# Patient Record
Sex: MALE | Race: WHITE
[De-identification: names, ages, dates, MRNs, and addresses within clinical notes are randomized per-mention and may not be internally consistent; named-entity substitution may affect disease eponyms.]

---

## 2018-08-05 ENCOUNTER — HOSPITAL ENCOUNTER (EMERGENCY)
Dept: HOSPITAL 97 - ER | Age: 38
Discharge: LEFT BEFORE DISCHARGE | End: 2018-08-05
Payer: SELF-PAY

## 2018-08-05 DIAGNOSIS — Z53.21: Primary | ICD-10-CM

## 2018-08-05 PROCEDURE — 99281 EMR DPT VST MAYX REQ PHY/QHP: CPT

## 2018-08-05 NOTE — ER
Nurse's Notes                                                                                     

 [facility]                                                                

Name: Edgar Patel                                                                         

Age: 37 yrs                                                                                       

Sex: Male                                                                                         

: 1980                                                                                   

MRN: [id number]                                                                                   

Arrival Date: 2018                                                                          

Time: 12:09                                                                                       

Account#: [id number]                                                                            

Bed Waiting                                                                                       

Private MD: None, None                                                                            

Diagnosis:                                                                                        

                                                                                                  

Presentation:                                                                                     

                                                                                             

12:17 Presenting complaint: Patient states: "I have these spots on my elbows and one on my    aj1 

      chest." Abscess noted to bilateral elbows, small scabbed area surrounded by redness         

      noted to chest. Patient denies fever. Transition of care: patient was not received from     

      another setting of care. Onset of symptoms was 2018. Risk Assessment: Do you       

      want to hurt yourself or someone else? Patient reports no desire to harm self or            

      others. Initial Sepsis Screen: Does the patient meet any 2 criteria? HR > 90 bpm. Does      

      the patient have a suspected source of infection? Yes: Skin breakdown/wound. Care prior     

      to arrival: None.                                                                           

12:17 Method Of Arrival: Ambulatory                                                           [facility] 

12:17 Acuity: MADI 3                                                                           aj1 

                                                                                                  

Triage Assessment:                                                                                

12:20 General: Appears in no apparent distress. uncomfortable, Behavior is cooperative,       aj1 

      restless. Pain: Complains of pain in right elbow and left elbow Pain currently is 8 out     

      of 10 on a pain scale. Neuro: Level of Consciousness is awake, alert, obeys commands,       

      Oriented to person, place, time, situation, Speech is normal. Cardiovascular: Patient's     

      skin is warm and dry. Respiratory: Airway is patent Respiratory effort is even,             

      unlabored, Respiratory pattern is regular, symmetrical. Derm: Skin is pink, warm \T\ dry.   

      normal.                                                                                     

                                                                                                  

Historical:                                                                                       

- Allergies:                                                                                      

12:20 No Known Allergies;                                                                     aj1 

- Home Meds:                                                                                      

12:20 None [Active];                                                                          aj1 

- PMHx:                                                                                           

12:20 Asthma; Bipolar disorder; Diabetes; Hypertension; Sleep Apnea;                          aj1 

                                                                                                  

- Immunization history:: Flu vaccine is not up to date.                                           

- Social history:: Smoking status: Patient uses tobacco products, smokes one pack                 

  cigarettes per day.                                                                             

- Ebola Screening: : Patient denies travel to an Ebola-affected area in the 21 days               

  before illness onset.                                                                           

                                                                                                  

                                                                                                  

Assessment:                                                                                       

13:06 Reassessment: pt not in lobby or parking lot.                                           iw  

                                                                                                  

Vital Signs:                                                                                      

12:20  / 101; Pulse 124; Resp 20; Temp 98.0(O); Pulse Ox 97% on R/A; Weight 120.2 kg    aj1 

      (R); Height 5 ft. 6 in. (167.64 cm) (R); Pain 8/10;                                         

12:20 Body Mass Index 42.77 (120.20 kg, 167.64 cm)                                            aj1 

                                                                                                  

ED Course:                                                                                        

12:09 Patient arrived in ED.                                                                  mr  

12:09 None, None is Private Physician.                                                        mr  

12:19 Triage completed.                                                                       aj1 

12:20 Arm band placed on Patient placed in waiting room, Patient notified of wait time.       aj1 

12:56 Patient's name was called from ER lobby. No response.                                   iw  

                                                                                                  

Administered Medications:                                                                         

No medications were administered                                                                  

                                                                                                  

                                                                                                  

Outcome:                                                                                          

13:07 Patient left the ED.                                                                    iw  

                                                                                                  

Signatures:                                                                                       

Sydney Madrigal, RN                     RN   aj1                                                  

Violeta Novak                                mr                                                   

Ethel Grimm, ASCENCION                     RN   iw                                                   

                                                                                                  

**************************************************************************************************

## 2018-08-06 ENCOUNTER — HOSPITAL ENCOUNTER (EMERGENCY)
Dept: HOSPITAL 97 - ER | Age: 38
Discharge: HOME | End: 2018-08-06
Payer: SELF-PAY

## 2018-08-06 DIAGNOSIS — F17.210: ICD-10-CM

## 2018-08-06 DIAGNOSIS — I10: ICD-10-CM

## 2018-08-06 DIAGNOSIS — L02.413: Primary | ICD-10-CM

## 2018-08-06 DIAGNOSIS — J45.909: ICD-10-CM

## 2018-08-06 DIAGNOSIS — B95.62: ICD-10-CM

## 2018-08-06 DIAGNOSIS — G47.30: ICD-10-CM

## 2018-08-06 DIAGNOSIS — E11.9: ICD-10-CM

## 2018-08-06 PROCEDURE — 99284 EMERGENCY DEPT VISIT MOD MDM: CPT

## 2018-08-06 PROCEDURE — 87205 SMEAR GRAM STAIN: CPT

## 2018-08-06 PROCEDURE — 87186 SC STD MICRODIL/AGAR DIL: CPT

## 2018-08-06 PROCEDURE — 0J9D0ZZ DRAINAGE OF RIGHT UPPER ARM SUBCUTANEOUS TISSUE AND FASCIA, OPEN APPROACH: ICD-10-PCS

## 2018-08-06 PROCEDURE — 87070 CULTURE OTHR SPECIMN AEROBIC: CPT

## 2018-08-06 PROCEDURE — 87077 CULTURE AEROBIC IDENTIFY: CPT

## 2018-08-06 NOTE — EDPHYS
Physician Documentation                                                                           

 [facility]                                                                

Name: Edgar Patel                                                                         

Age: 37 yrs                                                                                       

Sex: Male                                                                                         

: 1980                                                                                   

MRN: [id number]                                                                                   

Arrival Date: 2018                                                                          

Time: 04:39                                                                                       

Account#: [id number]                                                                            

Bed 5                                                                                             

Private MD:                                                                                       

ED Physician Dave Villarreal                                                                             

HPI:                                                                                              

                                                                                             

05:19 This 37 yrs old  Male presents to ER via Ambulatory with complaints of Skin    pkl 

      Problem.                                                                                    

05:19 The patient presents with an abscess of the right arm. Description: draining,           pkl 

      fluctuant, tense. Onset: The symptoms/episode began/occurred 4 day(s) ago.                  

                                                                                                  

Historical:                                                                                       

- Allergies:                                                                                      

05:06 No Known Allergies;                                                                     jd3 

- Home Meds:                                                                                      

05:06 None [Active];                                                                          jd3 

- PMHx:                                                                                           

05:06 Asthma; Bipolar disorder; Diabetes; Hypertension; Sleep Apnea;                          jd3 

- PSHx:                                                                                           

05:06 None;                                                                                   jd3 

                                                                                                  

- Immunization history:: Adult Immunizations up to date.                                          

- Social history:: Smoking status: Patient uses tobacco products, smokes one pack                 

  cigarettes per day.                                                                             

- Ebola Screening: : Patient negative for fever greater than or equal to 101.5 degrees            

  Fahrenheit, and additional compatible Ebola Virus Disease symptoms.                             

                                                                                                  

                                                                                                  

ROS:                                                                                              

05:19 Eyes: Negative for injury, pain, redness, and discharge, ENT: Negative for injury,      pkl 

      pain, and discharge, Neck: Negative for injury, pain, and swelling, Cardiovascular:         

      Negative for chest pain, palpitations, and edema, Respiratory: Negative for shortness       

      of breath, cough, wheezing, and pleuritic chest pain, Abdomen/GI: Negative for              

      abdominal pain, nausea, vomiting, diarrhea, and constipation, Back: Negative for injury     

      and pain, : Negative for injury, bleeding, discharge, and swelling, MS/Extremity:         

      Negative for injury and deformity.                                                          

05:19 Skin: Positive for abscess, of the right arm.                                               

05:19 Neuro: Negative for altered mental status.                                                  

                                                                                                  

Exam:                                                                                             

05:19 Head/Face:  Normocephalic, atraumatic. Eyes:  Pupils equal round and reactive to light, pkl 

      extra-ocular motions intact.  Lids and lashes normal.  Conjunctiva and sclera are           

      non-icteric and not injected.  Cornea within normal limits.  Periorbital areas with no      

      swelling, redness, or edema. ENT:  Nares patent. No nasal discharge, no septal              

      abnormalities noted.  Tympanic membranes are normal and external auditory canals are        

      clear.  Oropharynx with no redness, swelling, or masses, exudates, or evidence of           

      obstruction, uvula midline.  Mucous membranes moist. Neck:  Trachea midline, no             

      thyromegaly or masses palpated, and no cervical lymphadenopathy.  Supple, full range of     

      motion without nuchal rigidity, or vertebral point tenderness.  No Meningismus.             

      Chest/axilla:  Normal chest wall appearance and motion.  Nontender with no deformity.       

      No lesions are appreciated. Cardiovascular:  Regular rate and rhythm with a normal S1       

      and S2.  No gallops, murmurs, or rubs.  Normal PMI, no JVD.  No pulse deficits.             

      Respiratory:  Lungs have equal breath sounds bilaterally, clear to auscultation and         

      percussion.  No rales, rhonchi or wheezes noted.  No increased work of breathing, no        

      retractions or nasal flaring. Abdomen/GI:  Soft, non-tender, with normal bowel sounds.      

      No distension or tympany.  No guarding or rebound.  No evidence of tenderness               

      throughout. Back:  No spinal tenderness.  No costovertebral tenderness.  Full range of      

      motion. Neuro:  Awake and alert, GCS 15, oriented to person, place, time, and               

      situation.  Cranial nerves II-XII grossly intact.  Motor strength 5/5 in all                

      extremities.  Sensory grossly intact.  Cerebellar exam normal.  Normal gait.                

05:19 Skin: abscess, that is moderate sized, approximately  3 cm(s), with drainage, with          

      induration.                                                                                 

                                                                                                  

Vital Signs:                                                                                      

05:06  / 109; Pulse 113; Resp 16 S; Temp 98.6(O); Pulse Ox 98% on R/A; Weight 120.2 kg  jd3 

      (R); Height 5 ft. 6 in. (167.64 cm) (R); Pain 8/10;                                         

05:06 Body Mass Index 42.77 (120.20 kg, 167.64 cm)                                            jd3 

                                                                                                  

Procedures:                                                                                       

05:19 I \T\ D: Incision and drainage was performed for an abscess of the right Prepped with     pkl

      Betadine, Anesthetized with 3 ml's 1% Lidocaine. Incised with #11 blade. Drained            

      purulent fluid. bloody fluid. Packed with iodoform gauze, Dressing: sterile 4x4 gauze,      

      the patient tolerated the procedure well.                                                   

                                                                                                  

MDM:                                                                                              

04:54 Patient medically screened.                                                             [facility] 

05:19 Data reviewed: vital signs, nurses notes.                                               pkl 

                                                                                                  

                                                                                             

05:20 Order name: Wound Culture                                                               bb  

                                                                                             

05:20 Order name: Wound Culture                                                               bb  

                                                                                                  

Administered Medications:                                                                         

05:29 Drug: Bactrim (160 mg-800 mg (DS) 1 tablet Route: PO;                                   jd3 

05:29 Follow up: Response: Medication administered at discharge.                              jd3 

                                                                                                  

                                                                                                  

Disposition:                                                                                      

18 05:23 Discharged to Home. Impression: Abscess right arm.                                 

- Condition is Stable.                                                                            

                                                                                                  

- Prescriptions for Bactrim - 160 mg Oral Tablet - take 1 tablet by ORAL route              

  every 12 hours for 10 days; 20 tablet.                                                          

- Medication Reconciliation Form, Thank You Letter, Antibiotic Education, Prescription            

  Opioid Use form.                                                                                

- Follow up: Shemar Osman MD; When: 2 - 3 days; Reason: Re-evaluation by your                  

  physician.                                                                                      

- Problem is new.                                                                                 

- Symptoms have improved.                                                                         

                                                                                                  

                                                                                                  

                                                                                                  

Signatures:                                                                                       

Dispatcher [employer]                           EDMS                                                 

Dave Villarreal MD MD   pkBilly Shah RN                    RN   jd3                                                  

                                                                                                  

Corrections: (The following items were deleted from the chart)                                    

05:31 05:23 2018 05:23 Discharged to Home. Impression: Abscess right arm. Condition is  jd3 

      Stable. Forms are Medication Reconciliation Form, Thank You Letter, Antibiotic              

      Education, Prescription Opioid Use. Follow up: Shemar Osman; When: 2 - 3 days; Reason:     

      Re-evaluation by your physician. Problem is new. Symptoms have improved. pkl                

                                                                                                  

**************************************************************************************************

## 2018-08-06 NOTE — ER
Nurse's Notes                                                                                     

 [facility]                                                                

Name: Edgar Patel                                                                         

Age: 37 yrs                                                                                       

Sex: Male                                                                                         

: 1980                                                                                   

MRN: [id number]                                                                                   

Arrival Date: 2018                                                                          

Time: 04:39                                                                                       

Account#: [id number]                                                                            

Bed 5                                                                                             

Private MD:                                                                                       

Diagnosis: Abscess right arm                                                                      

                                                                                                  

Presentation:                                                                                     

                                                                                             

05:03 Presenting complaint: Patient states: "I have these sores on my elbows and I think they jd3 

      might be infected.". Transition of care: patient was not received from another setting      

      of care. Onset of symptoms was 2018. Risk Assessment: Do you want to hurt        

      yourself or someone else? Patient reports no desire to harm self or others. Initial         

      Sepsis Screen: Does the patient meet any 2 criteria? HR > 90 bpm. Yes Does the patient      

      have a suspected source of infection? Yes: Skin breakdown/wound. Care prior to arrival:     

      None.                                                                                       

05:03 Method Of Arrival: Ambulatory                                                           jd3 

05:03 Acuity: MADI 4                                                                           jd3 

                                                                                                  

Historical:                                                                                       

- Allergies:                                                                                      

05:06 No Known Allergies;                                                                     jd3 

- Home Meds:                                                                                      

05:06 None [Active];                                                                          jd3 

- PMHx:                                                                                           

05:06 Asthma; Bipolar disorder; Diabetes; Hypertension; Sleep Apnea;                          jd3 

- PSHx:                                                                                           

05:06 None;                                                                                   jd3 

                                                                                                  

- Immunization history:: Adult Immunizations up to date.                                          

- Social history:: Smoking status: Patient uses tobacco products, smokes one pack                 

  cigarettes per day.                                                                             

- Ebola Screening: : Patient negative for fever greater than or equal to 101.5 degrees            

  Fahrenheit, and additional compatible Ebola Virus Disease symptoms.                             

                                                                                                  

                                                                                                  

Screenin:09 Abuse screen: Denies threats or abuse. Nutritional screening: No deficits noted.        jd3 

      Tuberculosis screening: No symptoms or risk factors identified. Fall Risk Ambulatory        

      Aid- None/Bed Rest/Nurse Assist (0 pts). Gait- Normal/Bed Rest/Wheelchair (0 pts)           

      Mental Status- Oriented to own ability (0 pts). Total Rivers Fall Scale indicates No         

      Risk (0-24 pts).                                                                            

                                                                                                  

Assessment:                                                                                       

05:08 General: Appears in no apparent distress. uncomfortable, Behavior is calm, cooperative, jd3 

      appropriate for age. Pain: Complains of pain in right elbow and left elbow. Neuro:          

      Level of Consciousness is awake, alert, obeys commands, Oriented to person, place,          

      time, situation. Cardiovascular: Capillary refill < 3 seconds Patient's skin is warm        

      and dry. Respiratory: Airway is patent Respiratory effort is even, unlabored,               

      Respiratory pattern is regular, symmetrical. GI: No signs and/or symptoms were reported     

      involving the gastrointestinal system. : No signs and/or symptoms were reported           

      regarding the genitourinary system. EENT: No signs and/or symptoms were reported            

      regarding the EENT system. Derm: Skin is intact, Skin is dry, Skin is normal, Skin          

      temperature is warm Wound noted right elbow and left elbow Wound is wounds are open,        

      with swelling and redness noted. Musculoskeletal: Circulation, motion, and sensation        

      intact. Range of motion: intact in all extremities.                                         

05:30 Reassessment: Patient appears in no apparent distress at this time. Patient and/or      jd3 

      family updated on plan of care and expected duration. Pain level reassessed. Patient is     

      alert, oriented x 3, equal unlabored respirations, skin warm/dry/pink.                      

                                                                                                  

Vital Signs:                                                                                      

05:06  / 109; Pulse 113; Resp 16 S; Temp 98.6(O); Pulse Ox 98% on R/A; Weight 120.2 kg  jd3 

      (R); Height 5 ft. 6 in. (167.64 cm) (R); Pain 8/10;                                         

05:06 Body Mass Index 42.77 (120.20 kg, 167.64 cm)                                            jd3 

                                                                                                  

ED Course:                                                                                        

04:39 Patient arrived in ED.                                                                  am2 

04:54 Dave Villarreal MD is Attending Physician.                                                    pkl 

04:54 Billy Fallon, RN is Primary Nurse.                                                  jd3 

05:05 Triage completed.                                                                       jd3 

05:07 Arm band placed on.                                                                     jd3 

05:09 Patient has correct armband on for positive identification. Bed in low position. Call   j 

      light in reach. Side rails up X 1.                                                          

05:20 Assist provider with I \T\ D: of an abscess on right upper arm Set up I\T\D tray. Performed tl
1

      by Dave Villarreal MD Culture sent to lab. Wound packed. iodoform gauze, Dressing with 4X4s,        

      Patient tolerated well. Patient did not have IV access during this emergency room visit.    

05:23 Shemar Osman MD is Referral Physician.                                               pkl 

                                                                                                  

Administered Medications:                                                                         

05:29 Drug: Bactrim (160 mg-800 mg (DS) 1 tablet Route: PO;                                   jd3 

05:29 Follow up: Response: Medication administered at discharge.                              jd3 

                                                                                                  

                                                                                                  

Outcome:                                                                                          

05:23 Discharge ordered by MD.                                                                pkl 

05:30 Discharged to home ambulatory.                                                          jd3 

05:30 Condition: stable                                                                           

05:30 Discharge instructions given to patient, Instructed on discharge instructions, follow       

      up and referral plans. medication usage, Demonstrated understanding of instructions,        

      follow-up care, medications, Prescriptions given X 1.                                       

05:31 Patient left the ED.                                                                    jd3 

                                                                                                  

Addendum:                                                                                         

2018                                                                                        

     07:31 Addendum: Culture Results: Positive wound culture. No further action required. Bacteria i
w

           sensitive to prescribed antibiotic.                                                    

                                                                                                  

Signatures:                                                                                       

Dave Villarreal MD MD   pkl                                                  

Ethel Grimm RN                     RN   iw                                                   

Jaz Chávez RN                      RN   tl1                                                  

Keesha Viera Jonathon, RN                    RN   jd3                                                  

                                                                                                  

**************************************************************************************************

## 2019-03-29 ENCOUNTER — HOSPITAL ENCOUNTER (EMERGENCY)
Dept: HOSPITAL 97 - ER | Age: 39
Discharge: HOME | End: 2019-03-29
Payer: SELF-PAY

## 2019-03-29 DIAGNOSIS — I10: ICD-10-CM

## 2019-03-29 DIAGNOSIS — M54.5: Primary | ICD-10-CM

## 2019-03-29 DIAGNOSIS — F17.210: ICD-10-CM

## 2019-03-29 PROCEDURE — 99283 EMERGENCY DEPT VISIT LOW MDM: CPT

## 2019-03-29 NOTE — ER
Nurse's Notes                                                                                     

 [facility]                                                                 

Name: Edgar Patel                                                                         

Age: 38 yrs                                                                                       

Sex: Male                                                                                         

: 1980                                                                                   

MRN: [id number]                                                                                   

Arrival Date: 2019                                                                          

Time: 19:26                                                                                       

Account#: [id number]                                                                            

Bed 24                                                                                            

Private MD: None, None                                                                            

Diagnosis: Low back pain                                                                          

                                                                                                  

Presentation:                                                                                     

                                                                                             

19:32 Presenting complaint: Patient states: "3 weeks ago I had my sciatic nerve hurting and   aj1 

      now its at the point where I can barely get up or down. Every time I try to get up its      

      like a shocking pain" Patient reports that he has been taking ibuprofen at home with no     

      relief. Transition of care: patient was not received from another setting of care.          

      Onset of symptoms was . Risk Assessment: Do you want to hurt yourself or someone        

      else? Patient reports no desire to harm self or others. Initial Sepsis Screen: Does the     

      patient meet any 2 criteria? No. Patient's initial sepsis screen is negative. Does the      

      patient have a suspected source of infection? No. Patient's initial sepsis screen is        

      negative. Care prior to arrival: None.                                                      

19:32 Method Of Arrival: Ambulatory                                                           aj 

19:32 Acuity: MADI 4                                                                           aj1 

                                                                                                  

Triage Assessment:                                                                                

19:33 General: Appears in no apparent distress. uncomfortable, Behavior is calm, cooperative, aj1 

      appropriate for age. Pain: Complains of pain in back Pain currently is 8 out of 10 on a     

      pain scale. Neuro: Level of Consciousness is awake, alert, obeys commands.                  

      Cardiovascular: Patient's skin is warm and dry. Respiratory: Airway is patent               

      Respiratory effort is even, unlabored, Respiratory pattern is regular, symmetrical.         

      Musculoskeletal: Range of motion: intact in all extremities.                                

                                                                                                  

Historical:                                                                                       

- Allergies:                                                                                      

19:33 No Known Allergies;                                                                     aj1 

- Home Meds:                                                                                      

19:33 None [Active];                                                                          aj1 

- PMHx:                                                                                           

19:33 Asthma; Bipolar disorder; Diabetes; Hypertension; Sleep Apnea;                          aj1 

                                                                                                  

- Immunization history:: Flu vaccine is not up to date.                                           

- Social history:: Smoking status: Patient uses tobacco products, smokes one pack                 

  cigarettes per day.                                                                             

- Ebola Screening: : Patient denies travel to an Ebola-affected area in the 21 days               

  before illness onset.                                                                           

                                                                                                  

                                                                                                  

Screenin:45 Abuse screen: Denies threats or abuse. Nutritional screening: No deficits noted.        jb4 

      Tuberculosis screening: No symptoms or risk factors identified. Fall Risk None              

      identified.                                                                                 

                                                                                                  

Assessment:                                                                                       

19:45 General: Appears in no apparent distress. uncomfortable, Behavior is calm, cooperative, jb4 

      appropriate for age. Pain: Complains of pain in sacrum Pain radiates to back and left       

      leg Pain currently is 7 out of 10 on a pain scale. at worst was 10 out of 10 on a pain      

      scale. Quality of pain is described as shooting, stabbing, jolting Pain began 3 weeks       

      ago. Neuro: Level of Consciousness is awake, alert, obeys commands, Oriented to person,     

      place, time, situation, Moves all extremities. Full function. Cardiovascular: Patient's     

      skin is warm and dry. Respiratory: Airway is patent Respiratory effort is even,             

      unlabored, Respiratory pattern is regular, symmetrical. GI: No signs and/or symptoms        

      were reported involving the gastrointestinal system. : No signs and/or symptoms were      

      reported regarding the genitourinary system. EENT: No signs and/or symptoms were            

      reported regarding the EENT system. Derm: Skin is intact, Skin is pink, warm \T\ dry.       

      Musculoskeletal: Circulation, motion, and sensation intact. Range of motion: intact in      

      all extremities.                                                                            

                                                                                                  

Vital Signs:                                                                                      

19:33  / 96; Pulse 122; Resp 18; Temp 97.2; Pulse Ox 97% on R/A; Weight 104.33 kg (R);  aj1 

      Height 5 ft. 6 in. (167.64 cm);                                                             

19:33 Pain 8/10;                                                                              aj1 

20:03  / 88 LA (auto/lg); Pulse 101; Resp 18; Temp 99.6(O); Pulse Ox 96% ; Pain 7/10;   jp3 

19:33 Body Mass Index 37.12 (104.33 kg, 167.64 cm)                                            aj1 

                                                                                                  

ED Course:                                                                                        

19:26 Patient arrived in ED.                                                                  mr  

19:26 None, None is Private Physician.                                                        mr  

19:33 Triage completed.                                                                       aj1 

19:33 Arm band placed on Patient placed in an exam room.                                      aj1 

19:44 Shemar Portillo, RN is Primary Nurse.                                                     jb4 

19:45 Patient has correct armband on for positive identification. Bed in low position. Call   jb4 

      light in reach. Side rails up X 1. Pulse ox on. NIBP on.                                    

19:46 Susana Lee FNP-C is PHCP.                                                        kb  

19:46 Lauri Green MD is Attending Physician.                                             kb  

20:00 No provider procedures requiring assistance completed. Patient did not have IV access   jb4 

      during this emergency room visit.                                                           

                                                                                                  

Administered Medications:                                                                         

No medications were administered                                                                  

                                                                                                  

                                                                                                  

Outcome:                                                                                          

19:54 Discharge ordered by MD.                                                                kb  

20:00 Discharged to home ambulatory.                                                          jb4 

20:00 Condition: stable                                                                           

20:00 Discharge instructions given to patient, Instructed on discharge instructions, follow       

      up and referral plans. medication usage, Demonstrated understanding of instructions,        

      follow-up care, medications, Prescriptions given X 1.                                       

20:18 Patient left the ED.                                                                    jb4 

                                                                                                  

Signatures:                                                                                       

Susana Lee FNP-C                 FNP-Sydney Vera, RN                     RN   aj1                                                  

Rachel Novak James, RN                       RN   jb4                                                  

Baldemar Iniguez jp3                                                  

                                                                                                  

**************************************************************************************************

## 2019-03-29 NOTE — EDPHYS
Physician Documentation                                                                           

 [facility]                                                                 

Name: Edgar Patel                                                                         

Age: 38 yrs                                                                                       

Sex: Male                                                                                         

: 1980                                                                                   

MRN: [id number]                                                                                   

Arrival Date: 2019                                                                          

Time: 19:26                                                                                       

Account#: [id number]                                                                            

Bed 24                                                                                            

Private MD: None, None                                                                            

ED Physician Lauri Green                                                                      

HPI:                                                                                              

                                                                                             

19:54 This 38 yrs old  Male presents to ER via Ambulatory with complaints of Back    kb  

      Pain.                                                                                       

19:54 The patient presents with pain that is acute, with no known mechanism of injury. The    kb  

      symptoms are located in the sacrum. Onset: The symptoms/episode began/occurred 3            

      week(s) ago. The pain does not radiate. Associated signs and symptoms: Pertinent            

      positives: none. The problem was sustained without known cause. Modifying factors: The      

      patient symptoms are alleviated by nothing, the patient symptoms are aggravated by any      

      movement. Severity of symptoms: At their worst the symptoms were moderate, in the           

      emergency department the symptoms are unchanged. The patient has not experienced            

      similar symptoms in the past. The patient has not recently seen a physician.                

                                                                                                  

Historical:                                                                                       

- Allergies:                                                                                      

19:33 No Known Allergies;                                                                     aj1 

- Home Meds:                                                                                      

19:33 None [Active];                                                                          aj1 

- PMHx:                                                                                           

19:33 Asthma; Bipolar disorder; Diabetes; Hypertension; Sleep Apnea;                          aj1 

                                                                                                  

- Immunization history:: Flu vaccine is not up to date.                                           

- Social history:: Smoking status: Patient uses tobacco products, smokes one pack                 

  cigarettes per day.                                                                             

- Ebola Screening: : Patient denies travel to an Ebola-affected area in the 21 days               

  before illness onset.                                                                           

                                                                                                  

                                                                                                  

ROS:                                                                                              

19:53 Constitutional: Negative for fever, chills, and weight loss, Cardiovascular: Negative   kb  

      for chest pain, palpitations, and edema, Respiratory: Negative for shortness of breath,     

      cough, wheezing, and pleuritic chest pain, Abdomen/GI: Negative for abdominal pain,         

      nausea, vomiting, diarrhea, and constipation, : Negative for injury, bleeding,            

      discharge, and swelling, MS/Extremity: Negative for injury and deformity, Skin:             

      Negative for injury, rash, and discoloration, Neuro: Negative for headache, weakness,       

      numbness, tingling, and seizure.                                                            

19:53 Back: Positive for pain at rest, pain with movement, of the sacrum.                         

                                                                                                  

Exam:                                                                                             

19:53 Constitutional:  This is a well developed, well nourished patient who is awake, alert,  kb  

      and in no acute distress. Head/Face:  Normocephalic, atraumatic. Chest/axilla:  Normal      

      chest wall appearance and motion.  Nontender with no deformity.  No lesions are             

      appreciated. Cardiovascular:  Regular rate and rhythm with a normal S1 and S2.  No          

      gallops, murmurs, or rubs.  Normal PMI, no JVD.  No pulse deficits. Respiratory:  Lungs     

      have equal breath sounds bilaterally, clear to auscultation and percussion.  No rales,      

      rhonchi or wheezes noted.  No increased work of breathing, no retractions or nasal          

      flaring. Abdomen/GI:  Soft, non-tender, with normal bowel sounds.  No distension or         

      tympany.  No guarding or rebound.  No evidence of tenderness throughout. Back:  No          

      spinal tenderness.  No costovertebral tenderness.  Full range of motion. Skin:  Warm,       

      dry with normal turgor.  Normal color with no rashes, no lesions, and no evidence of        

      cellulitis. MS/ Extremity:  Pulses equal, no cyanosis.  Neurovascular intact.  Full,        

      normal range of motion. Neuro:  Awake and alert, GCS 15, oriented to person, place,         

      time, and situation.  Cranial nerves II-XII grossly intact.  Motor strength 5/5 in all      

      extremities.  Sensory grossly intact.  Cerebellar exam normal.  Normal gait.                

                                                                                                  

Vital Signs:                                                                                      

19:33  / 96; Pulse 122; Resp 18; Temp 97.2; Pulse Ox 97% on R/A; Weight 104.33 kg (R);  aj1 

      Height 5 ft. 6 in. (167.64 cm);                                                             

19:33 Pain 8/10;                                                                              aj1 

20:03  / 88 LA (auto/lg); Pulse 101; Resp 18; Temp 99.6(O); Pulse Ox 96% ; Pain 7/10;   jp3 

19:33 Body Mass Index 37.12 (104.33 kg, 167.64 cm)                                            aj1 

                                                                                                  

MDM:                                                                                              

19:46 Patient medically screened.                                                             kb  

19:53 Data reviewed: vital signs, nurses notes. Data interpreted: Pulse oximetry: on room air kb  

      is 97 %. Interpretation: normal. Counseling: I had a detailed discussion with the           

      patient and/or guardian regarding: the historical points, exam findings, and any            

      diagnostic results supporting the discharge/admit diagnosis, the need for outpatient        

      follow up, a family practitioner, to return to the emergency department if symptoms         

      worsen or persist or if there are any questions or concerns that arise at home.             

                                                                                                  

                                                                                             

19:52 Order name: Vital Signs; Complete Time: 20:03                                           kb  

                                                                                                  

Administered Medications:                                                                         

No medications were administered                                                                  

                                                                                                  

                                                                                                  

Disposition:                                                                                      

19 19:54 Discharged to Home. Impression: Low back pain.                                     

- Condition is Stable.                                                                            

- Discharge Instructions: Back Injury Prevention, Easy-to-Read, Back Pain, Adult,                 

  Easy-to-Read, Back Exercises, Easy-to-Read.                                                     

- Prescriptions for Cyclobenzaprine 10 mg Oral Tablet - take 1 tablet by ORAL route               

  every 8 hours As needed; 21 tablet.                                                             

- Medication Reconciliation Form, Thank You Letter, Antibiotic Education, Prescription            

  Opioid Use form.                                                                                

- Follow up: Private Physician; When: 2 - 3 days; Reason: Recheck today's complaints,             

  Continuance of care, Re-evaluation by your physician. Follow up: Emergency                      

  Department; When: As needed; Reason: Worsening of condition.                                    

                                                                                                  

                                                                                                  

                                                                                                  

Addendum:                                                                                         

2019                                                                                        

     11:12 Co-signature as Attending Physician, Lauri Green MD I agree with the assessment and  c
ha

           plan of care.                                                                          

                                                                                                  

Signatures:                                                                                       

Susana Lee, CONNIE-C                 BRADYP-Sydney Vera RN                     RN   aj1                                                  

Lauri Green MD MD cha Bryson, James, RN                       RN   jb4                                                  

                                                                                                  

Corrections: (The following items were deleted from the chart)                                    

                                                                                             

20:18 19:54 2019 19:54 Discharged to Home. Impression: Low back pain. Condition is      jb4 

      Stable. Forms are Medication Reconciliation Form, Thank You Letter, Antibiotic              

      Education, Prescription Opioid Use. Follow up: Private Physician; When: 2 - 3 days;         

      Reason: Recheck today's complaints, Continuance of care, Re-evaluation by your              

      physician. Follow up: Emergency Department; When: As needed; Reason: Worsening of           

      condition. kb                                                                               

                                                                                                  

**************************************************************************************************

## 2019-06-07 ENCOUNTER — HOSPITAL ENCOUNTER (INPATIENT)
Dept: HOSPITAL 97 - ER | Age: 39
LOS: 3 days | Discharge: HOME | DRG: 683 | End: 2019-06-10
Attending: HOSPITALIST | Admitting: HOSPITALIST
Payer: SELF-PAY

## 2019-06-07 DIAGNOSIS — C80.1: ICD-10-CM

## 2019-06-07 DIAGNOSIS — F17.210: ICD-10-CM

## 2019-06-07 DIAGNOSIS — K21.9: ICD-10-CM

## 2019-06-07 DIAGNOSIS — E86.0: ICD-10-CM

## 2019-06-07 DIAGNOSIS — I10: ICD-10-CM

## 2019-06-07 DIAGNOSIS — R79.89: ICD-10-CM

## 2019-06-07 DIAGNOSIS — R91.8: ICD-10-CM

## 2019-06-07 DIAGNOSIS — M54.9: ICD-10-CM

## 2019-06-07 DIAGNOSIS — E87.6: ICD-10-CM

## 2019-06-07 DIAGNOSIS — E66.9: ICD-10-CM

## 2019-06-07 DIAGNOSIS — C78.00: ICD-10-CM

## 2019-06-07 DIAGNOSIS — R11.2: ICD-10-CM

## 2019-06-07 DIAGNOSIS — E86.1: ICD-10-CM

## 2019-06-07 DIAGNOSIS — F19.11: ICD-10-CM

## 2019-06-07 DIAGNOSIS — N17.9: Primary | ICD-10-CM

## 2019-06-07 DIAGNOSIS — F81.9: ICD-10-CM

## 2019-06-07 LAB
ALBUMIN SERPL BCP-MCNC: 4.7 G/DL (ref 3.4–5)
ALP SERPL-CCNC: 65 U/L (ref 45–117)
ALT SERPL W P-5'-P-CCNC: 40 U/L (ref 12–78)
AST SERPL W P-5'-P-CCNC: 94 U/L (ref 15–37)
BUN BLD-MCNC: 60 MG/DL (ref 7–18)
GLUCOSE SERPLBLD-MCNC: 167 MG/DL (ref 74–106)
HCT VFR BLD CALC: 47.3 % (ref 39.6–49)
LIPASE SERPL-CCNC: 94 U/L (ref 73–393)
LYMPHOCYTES # SPEC AUTO: 1.8 K/UL (ref 0.7–4.9)
PMV BLD: 10.3 FL (ref 7.6–11.3)
POTASSIUM SERPL-SCNC: 3.6 MMOL/L (ref 3.5–5.1)
RBC # BLD: 5.37 M/UL (ref 4.33–5.43)

## 2019-06-07 PROCEDURE — 87340 HEPATITIS B SURFACE AG IA: CPT

## 2019-06-07 PROCEDURE — 36415 COLL VENOUS BLD VENIPUNCTURE: CPT

## 2019-06-07 PROCEDURE — 81003 URINALYSIS AUTO W/O SCOPE: CPT

## 2019-06-07 PROCEDURE — 87389 HIV-1 AG W/HIV-1&-2 AB AG IA: CPT

## 2019-06-07 PROCEDURE — 80307 DRUG TEST PRSMV CHEM ANLYZR: CPT

## 2019-06-07 PROCEDURE — 96375 TX/PRO/DX INJ NEW DRUG ADDON: CPT

## 2019-06-07 PROCEDURE — 85025 COMPLETE CBC W/AUTO DIFF WBC: CPT

## 2019-06-07 PROCEDURE — 80048 BASIC METABOLIC PNL TOTAL CA: CPT

## 2019-06-07 PROCEDURE — 76770 US EXAM ABDO BACK WALL COMP: CPT

## 2019-06-07 PROCEDURE — 96361 HYDRATE IV INFUSION ADD-ON: CPT

## 2019-06-07 PROCEDURE — 99285 EMERGENCY DEPT VISIT HI MDM: CPT

## 2019-06-07 PROCEDURE — 74176 CT ABD & PELVIS W/O CONTRAST: CPT

## 2019-06-07 PROCEDURE — 84100 ASSAY OF PHOSPHORUS: CPT

## 2019-06-07 PROCEDURE — 83690 ASSAY OF LIPASE: CPT

## 2019-06-07 PROCEDURE — 87088 URINE BACTERIA CULTURE: CPT

## 2019-06-07 PROCEDURE — 87086 URINE CULTURE/COLONY COUNT: CPT

## 2019-06-07 PROCEDURE — 86803 HEPATITIS C AB TEST: CPT

## 2019-06-07 PROCEDURE — 96374 THER/PROPH/DIAG INJ IV PUSH: CPT

## 2019-06-07 PROCEDURE — 71250 CT THORAX DX C-: CPT

## 2019-06-07 PROCEDURE — 83735 ASSAY OF MAGNESIUM: CPT

## 2019-06-07 PROCEDURE — 80076 HEPATIC FUNCTION PANEL: CPT

## 2019-06-07 PROCEDURE — 84132 ASSAY OF SERUM POTASSIUM: CPT

## 2019-06-07 PROCEDURE — 81015 MICROSCOPIC EXAM OF URINE: CPT

## 2019-06-07 PROCEDURE — 86704 HEP B CORE ANTIBODY TOTAL: CPT

## 2019-06-08 LAB
BUN BLD-MCNC: 49 MG/DL (ref 7–18)
CAOX CRY URNS QL MICRO: (no result)
GLUCOSE SERPLBLD-MCNC: 125 MG/DL (ref 74–106)
MAGNESIUM SERPL-MCNC: 2.8 MG/DL (ref 1.8–2.4)
METHAMPHET UR QL SCN: POSITIVE
POTASSIUM SERPL-SCNC: 3.2 MMOL/L (ref 3.5–5.1)
THC SERPL-MCNC: NEGATIVE NG/ML
UA COMPLETE W REFLEX CULTURE PNL UR: (no result)
UA DIPSTICK W REFLEX MICRO PNL UR: (no result)

## 2019-06-08 RX ADMIN — Medication SCH: at 21:00

## 2019-06-08 RX ADMIN — SODIUM CHLORIDE SCH: 0.9 INJECTION, SOLUTION INTRAVENOUS at 11:00

## 2019-06-08 RX ADMIN — MORPHINE SULFATE PRN MG: 4 INJECTION, SOLUTION INTRAMUSCULAR; INTRAVENOUS at 14:51

## 2019-06-08 RX ADMIN — SODIUM CHLORIDE SCH MLS: 0.9 INJECTION, SOLUTION INTRAVENOUS at 22:25

## 2019-06-08 RX ADMIN — SODIUM CHLORIDE SCH MLS: 0.9 INJECTION, SOLUTION INTRAVENOUS at 04:24

## 2019-06-08 RX ADMIN — MORPHINE SULFATE PRN MG: 4 INJECTION, SOLUTION INTRAMUSCULAR; INTRAVENOUS at 05:21

## 2019-06-08 RX ADMIN — ENOXAPARIN SODIUM SCH MG: 40 INJECTION SUBCUTANEOUS at 08:51

## 2019-06-08 RX ADMIN — SODIUM CHLORIDE SCH: 0.9 INJECTION, SOLUTION INTRAVENOUS at 19:00

## 2019-06-08 RX ADMIN — SODIUM CHLORIDE SCH MLS: 0.9 INJECTION, SOLUTION INTRAVENOUS at 14:51

## 2019-06-08 RX ADMIN — Medication SCH ML: at 08:52

## 2019-06-08 NOTE — EDPHYS
Physician Documentation                                                                           

 [facility]                                                                 

Name: Edgar Patel                                                                         

Age: 38 yrs                                                                                       

Sex: Male                                                                                         

: 1980                                                                                   

MRN: [id number]                                                                                   

Arrival Date: 2019                                                                          

Time: 22:40                                                                                       

Account#: [id number]                                                                            

Bed 28                                                                                            

Private MD:                                                                                       

ED Physician Collins Sigala                                                                     

HPI:                                                                                              

                                                                                             

03:37 This 38 yrs old  Male presents to ER via Wheelchair with complaints of         tw4 

      Abdominal Pain.                                                                             

03:37 The patient presents with abdominal pain in the epigastric area. Onset: The             tw4 

      symptoms/episode began/occurred today. The symptoms do not radiate. Associated signs        

      and symptoms: none. The symptoms are described as dull. Modifying factors: The symptoms     

      are alleviated by nothing, the symptoms are aggravated by nothing. Severity of pain: At     

      its worst the pain was moderate in the emergency department the pain. The patient has       

      not experienced similar symptoms in the past.                                               

                                                                                                  

Historical:                                                                                       

- Allergies:                                                                                      

                                                                                             

22:54 No Known Allergies;                                                                     ca1 

- Home Meds:                                                                                      

22:54 None [Active];                                                                          ca1 

- PMHx:                                                                                           

22:54 Asthma; Bipolar disorder; Diabetes; Hypertension; Sleep Apnea;                          ca1 

- PSHx:                                                                                           

22:54 None;                                                                                   ca1 

                                                                                                  

- Immunization history:: Adult Immunizations not up to date, Flu vaccine is not up to             

  date.                                                                                           

- Social history:: Smoking status: Patient uses tobacco products, smokes one-half pack            

  cigarettes per day.                                                                             

- Ebola Screening: : Patient negative for fever greater than or equal to 101.5 degrees            

  Fahrenheit, and additional compatible Ebola Virus Disease symptoms Patient denies               

  exposure to infectious person Patient denies travel to an Ebola-affected area in the            

  21 days before illness onset.                                                                   

                                                                                                  

                                                                                                  

ROS:                                                                                              

                                                                                             

03:37 Constitutional: Negative for fever, chills, and weight loss, Cardiovascular: Negative   tw4 

      for chest pain, palpitations, and edema, Respiratory: Negative for shortness of breath,     

      cough, wheezing, and pleuritic chest pain, Back: Negative for injury and pain, Neuro:       

      Negative for headache, weakness, numbness, tingling, and seizure, Psych: Negative for       

      depression, anxiety, suicide ideation, homicidal ideation, and hallucinations.              

      Constitutional: Positive for                                                                

      Abdomen/GI: Positive for abdominal pain, abdominal cramps, abdominal distension.            

                                                                                                  

Exam:                                                                                             

03:37 Constitutional:  This is a well developed, well nourished patient who is awake, alert,  tw4 

      and in no acute distress. Cardiovascular:  Regular rate and rhythm with a normal S1 and     

      S2.  No gallops, murmurs, or rubs.  Normal PMI, no JVD.  No pulse deficits.                 

      Respiratory:  Lungs have equal breath sounds bilaterally, clear to auscultation and         

      percussion.  No rales, rhonchi or wheezes noted.  No increased work of breathing, no        

      retractions or nasal flaring.                                                               

03:37 Abdomen/GI:  Soft, non-tender, with normal bowel sounds.  No distension or tympany.  No     

      guarding or rebound.  No evidence of tenderness throughout. Back:  No spinal                

      tenderness.  No costovertebral tenderness.  Full range of motion. MS/ Extremity:            

      Pulses equal, no cyanosis.  Neurovascular intact.  Full, normal range of motion. Neuro:     

       Awake and alert, GCS 15, oriented to person, place, time, and situation.  Cranial          

      nerves II-XII grossly intact.  Motor strength 5/5 in all extremities.  Sensory grossly      

      intact.  Cerebellar exam normal.  Normal gait.                                              

                                                                                                  

Vital Signs:                                                                                      

                                                                                             

22:54  / 94; Pulse 102; Resp 19 S; Temp 97.8(O); Pulse Ox 94% on R/A; Weight 111.13 kg; ca1 

      Height 5 ft. 6 in. (167.64 cm); Pain 8/10;                                                  

23:59  / 83; Pulse 90; Resp 18 S; Pulse Ox 96% on R/A;                                  ca1 

                                                                                             

00:30  / 88; Pulse 86; Resp 17; Pulse Ox 96% ;                                          rv  

01:30  / 72; Pulse 89; Resp 17; Pulse Ox 100% ;                                         rv  

02:00  / 81; Pulse 81; Resp 17; Temp 98; Pulse Ox 99% ;                                 rv  

03:00  / 86; Pulse 87; Resp 16; Pulse Ox 97% ;                                          rv  

                                                                                             

22:54 Body Mass Index 39.54 (111.13 kg, 167.64 cm)                                            ca1 

                                                                                                  

MDM:                                                                                              

                                                                                             

23:03 Patient medically screened.                                                             tw4 

                                                                                             

03:37 Data reviewed: vital signs, nurses notes. Data interpreted: Pulse oximetry:.            tw4 

      Counseling: I had a detailed discussion with the patient and/or guardian regarding: the     

      historical points, exam findings, and any diagnostic results supporting the                 

      discharge/admit diagnosis, radiology results. Physician consultation: Isabel Ruano MD      

      regarding admission, and will see patient in ED.                                            

                                                                                                  

                                                                                             

23:02 Order name: Basic Metabolic Panel                                                       [facility] 

                                                                                             

23:02 Order name: CBC with Diff; Complete Time: 00:20                                         tw 

                                                                                             

00:22 Interpretation: Normal except: WBC 17.9; SYDNEY% 81.5; LYM% 10.3; NEUT A 14.6.             tw 

                                                                                             

23:02 Order name: Creatinine for Radiology; Complete Time: 00:44                              [facility] 

                                                                                             

00:44 Interpretation: Normal except: CRE 2.81; GFR 25.                                        tw 

                                                                                             

23:02 Order name: Hepatic Function; Complete Time: 00:44                                      [facility] 

                                                                                             

00:44 Interpretation: AST 94; BILIT 1.2; BILID 0.3; TP 9.3; GLOB 4.6; A/G 1.0.                [facility] 

                                                                                             

23:02 Order name: Lipase; Complete Time: 00:44                                                [facility] 

                                                                                             

00:44 Interpretation: Within normal limits: LIP 94.                                           [facility] 

                                                                                             

23:02 Order name: Urine Microscopic Only                                                      [facility] 

                                                                                             

23:03 Order name: Basic Metabolic Panel; Complete Time: 00:44                                 [facility]

                                                                                             

00:44 Interpretation: Normal except: GLUC 167; ; BUN 60; CRE 2.73; GFR 26.              [facility] 

                                                                                             

00:47 Order name: Abdomen                                                                     [facility]

                                                                                             

01:08 Order name: Urine Culture                                                               [facility]

                                                                                             

03:04 Order name: Thorax Wo Con                                                               [facility]

                                                                                             

23:02 Order name: IV Saline Lock; Complete Time: 23:08                                        [facility] 

                                                                                             

23:02 Order name: Labs collected and sent; Complete Time: 23:08                               [facility] 

                                                                                             

23:02 Order name: Urine Dipstick-Ancillary (obtain specimen); Complete Time: 23:18            [facility] 

                                                                                             

03:03 Order name: CONS Physician Consult                                                      [facility]

                                                                                             

03:03 Order name: NPO                                                                         [facility]

                                                                                                  

Administered Medications:                                                                         

01:45 Drug: GI Cocktail without Donnatal - (Maalox Suspension 30 ml, Lidocaine Liquid 2 % 15  rv  

      ml) Route: PO;                                                                              

03:03 Follow up: Response: Marked relief of symptoms                                          rv  

01:45 Drug: NS 0.9% 1000 ml Route: IV; Rate: 1 bolus; Site: left antecubital;                 rv  

03:03 Follow up: IV Status: Completed infusion; IV Intake: 1000ml                             rv  

01:46 Drug: Zofran 4 mg Route: IVP; Site: left antecubital;                                   rv  

03:03 Follow up: Response: Marked relief of symptoms                                          rv  

01:48 Drug: ProTONIX 40 mg Route: IVP; Site: left antecubital;                                rv  

03:02 Follow up: Response: Marked relief of symptoms                                          rv  

                                                                                                  

                                                                                                  

Disposition:                                                                                      

19 02:39 Hospitalization ordered by Isabel Ruano for Inpatient Admission. Preliminary     

  diagnosis are Acute kidney failure, Dehydration, Cyclical vomiting,                             

  intractable.                                                                                    

- Bed requested for Telemetry/MedSurg (Inpatient).                                                

- Status is Inpatient Admission.                                                              fc  

- Condition is Stable.                                                                            

- Problem is new.                                                                                 

- Symptoms have improved.                                                                         

UTI on Admission? No                                                                              

                                                                                                  

                                                                                                  

                                                                                                  

Signatures:                                                                                       

Dispatcher [employer]                           EDMS                                                 

Ning Vides RN RN                                                      

Skyla Hammond RN RN                                                      

Collins Sigala MD MD   tw4                                                  

Favian Wilkerson RN RN                                                      

Hoda Workman RN                        [facility]   ca1                                                  

                                                                                                  

Corrections: (The following items were deleted from the chart)                                    

00:47 00:22 Abdomen Pelvis W Con+CT.RAD.BRZ ordered. [facility]                                     EDMS

03:15 02:39 Hospitalization Ordered by Isabel Ruano MD for Inpatient Admission. Preliminary    

      diagnosis is Acute kidney failure; Dehydration; Cyclical vomiting, intractable. Bed         

      requested for Telemetry/MedSurg (Inpatient). Status is Inpatient Admission. Condition       

      is Stable. Problem is new. Symptoms have improved. UTI on Admission? No. tw4                

03:55 03:15 2019 02:39 Hospitalization Ordered by Isabel Ruano MD for Inpatient        fc  

      Admission. Preliminary diagnosis is Acute kidney failure; Dehydration; Cyclical             

      vomiting, intractable. Bed requested for Telemetry/MedSurg (Inpatient). Status is           

      Inpatient Admission. Condition is Stable. Problem is new. Symptoms have improved. UTI       

      on Admission? No. mw                                                                        

                                                                                                  

**************************************************************************************************

## 2019-06-08 NOTE — ER
Nurse's Notes                                                                                     

 [facility]                                                                 

Name: Edgar Patel                                                                         

Age: 38 yrs                                                                                       

Sex: Male                                                                                         

: 1980                                                                                   

MRN: [id number]                                                                                   

Arrival Date: 2019                                                                          

Time: 22:40                                                                                       

Account#: [id number]                                                                            

Bed 28                                                                                            

Private MD:                                                                                       

Diagnosis: Acute kidney failure;Dehydration;Cyclical vomiting, intractable                        

                                                                                                  

Presentation:                                                                                     

                                                                                             

22:52 Presenting complaint: Patient states: "I have abdominal pain since this morning and I   ca1 

      have been throwing up as well." Pt Denies diarrhea and constipation. Transition of          

      care: patient was not received from another setting of care. Onset of symptoms was 2019. Risk Assessment: Do you want to hurt yourself or someone else? Patient            

      reports no desire to harm self or others. Initial Sepsis Screen: Does the patient meet      

      any 2 criteria? No. Patient's initial sepsis screen is negative. Does the patient have      

      a suspected source of infection? No. Patient's initial sepsis screen is negative. Care      

      prior to arrival: None.                                                                     

22:52 Method Of Arrival: Wheelchair                                                           ca1 

22:52 Acuity: MADI 3                                                                           ca1 

                                                                                                  

Triage Assessment:                                                                                

22:54 General: Appears in no apparent distress. comfortable, Behavior is calm, cooperative,   ca1 

      appropriate for age. Pain: Complains of pain in right upper quadrant and left upper         

      quadrant Pain does not radiate. Pain currently is 8 out of 10 on a pain scale. Pain         

      began this morning. GI: Abdomen is flat, non-distended, Bowel sounds present X 4 quads.     

      Abd is soft and non tender X 4 quads. Reports nausea, vomiting, since this morning.         

                                                                                                  

Historical:                                                                                       

- Allergies:                                                                                      

22:54 No Known Allergies;                                                                     ca1 

- Home Meds:                                                                                      

22:54 None [Active];                                                                          ca1 

- PMHx:                                                                                           

22:54 Asthma; Bipolar disorder; Diabetes; Hypertension; Sleep Apnea;                          ca1 

- PSHx:                                                                                           

22:54 None;                                                                                   ca1 

                                                                                                  

- Immunization history:: Adult Immunizations not up to date, Flu vaccine is not up to             

  date.                                                                                           

- Social history:: Smoking status: Patient uses tobacco products, smokes one-half pack            

  cigarettes per day.                                                                             

- Ebola Screening: : Patient negative for fever greater than or equal to 101.5 degrees            

  Fahrenheit, and additional compatible Ebola Virus Disease symptoms Patient denies               

  exposure to infectious person Patient denies travel to an Ebola-affected area in the            

  21 days before illness onset.                                                                   

                                                                                                  

                                                                                                  

Screenin:00 Abuse screen: Denies threats or abuse. Denies injuries from another. Nutritional        ca1 

      screening: No deficits noted. Tuberculosis screening: No symptoms or risk factors           

      identified. Fall Risk IV access (20 points). Ambulatory Aid- Crutches/Cane/Walker (15       

      pts).                                                                                       

                                                                                                  

Assessment:                                                                                       

23:00 General: Appears in no apparent distress. comfortable, unkempt, Behavior is calm,       ca1 

      cooperative, appropriate for age. General: Appears. Pain: Complains of pain in abdomen      

      and left upper quadrant and right upper quadrant Pain does not radiate. Pain currently      

      is 8 out of 10 on a pain scale. Pain began this morning. Neuro: Level of Consciousness      

      is awake, alert, obeys commands, Oriented to person, place, time, situation.                

      Cardiovascular: Heart tones S1 S2 present Capillary refill < 3 seconds Patient's skin       

      is warm and dry. Respiratory: Airway is patent Respiratory effort is even, unlabored,       

      Respiratory pattern is regular, symmetrical, Breath sounds are clear bilaterally. GI:       

      Abdomen is round non-distended, Bowel sounds present X 4 quads. Abd is soft and non         

      tender X 4 quads. GI: Reports vomiting, since this morning. : No deficits noted. No       

      signs and/or symptoms were reported regarding the genitourinary system. EENT: No            

      deficits noted. No signs and/or symptoms were reported regarding the EENT system. Derm:     

      Skin is intact, is healthy with good turgor, Skin is pink, warm \T\ dry. Musculoskeletal:   

      Circulation, motion, and sensation intact. Capillary refill < 3 seconds, Range of           

      motion: intact in all extremities.                                                          

23:59 Reassessment: Patient appears in no apparent distress at this time. Patient and/or      ca1 

      family updated on plan of care and expected duration. Pain level reassessed. Patient is     

      alert, oriented x 3, equal unlabored respirations, skin warm/dry/pink.                      

                                                                                             

00:39 Reassessment: Patient appears in no apparent distress at this time. Patient and/or      rv  

      family updated on plan of care and expected duration. Pain level reassessed. Patient is     

      alert, oriented x 3, equal unlabored respirations, skin warm/dry/pink.                      

01:13 Reassessment: patient came in to see and check for the patient. left a number in case   rv  

      patient needs transport upon discharge. [phone] Jane Prince.                              

02:27 Reassessment: patient is for admission. awaiting orders.                                rv  

03:33 Reassessment: Patient appears in no apparent distress at this time. Patient and/or      jd3 

      family updated on plan of care and expected duration. Pain level reassessed. Patient is     

      alert, oriented x 3, equal unlabored respirations, skin warm/dry/pink. report given to      

      Jonelle VEGAS.                                                                                  

                                                                                                  

Vital Signs:                                                                                      

                                                                                             

22:54  / 94; Pulse 102; Resp 19 S; Temp 97.8(O); Pulse Ox 94% on R/A; Weight 111.13 kg; ca1 

      Height 5 ft. 6 in. (167.64 cm); Pain 8/10;                                                  

23:59  / 83; Pulse 90; Resp 18 S; Pulse Ox 96% on R/A;                                  ca1 

                                                                                             

00:30  / 88; Pulse 86; Resp 17; Pulse Ox 96% ;                                          rv  

01:30  / 72; Pulse 89; Resp 17; Pulse Ox 100% ;                                         rv  

02:00  / 81; Pulse 81; Resp 17; Temp 98; Pulse Ox 99% ;                                 rv  

03:00  / 86; Pulse 87; Resp 16; Pulse Ox 97% ;                                          rv  

                                                                                             

22:54 Body Mass Index 39.54 (111.13 kg, 167.64 cm)                                            ca1 

                                                                                                  

ED Course:                                                                                        

                                                                                             

22:40 Patient arrived in ED.                                                                  ag3 

22:43 Hoda Workman, RN is Primary Nurse.                                                      ca1 

22:53 Triage completed.                                                                       ca1 

22:54 Arm band placed on right wrist.                                                         ca1 

23:00 Patient has correct armband on for positive identification. Placed in gown. Bed in low  ca1 

      position. Call light in reach. Side rails up X 1. Pulse ox on. NIBP on. Warm blanket        

      given.                                                                                      

23:00 No provider procedures requiring assistance completed.                                  ca1 

23:02 Collins Sigala MD is Attending Physician.                                            tw4 

23:09 Inserted saline lock: 20 gauge in left antecubital area, using aseptic technique. Blood rv  

      collected.                                                                                  

                                                                                             

01:22 Abdomen In Process Unspecified.                                                         EDMS

02:38 Isabel Ruano MD is Hospitalizing Provider.                                           tw4 

03:09 Report given to Billy.                                                               rv  

03:35 Patient admitted, IV remains in place.                                                  jd3 

                                                                                                  

Administered Medications:                                                                         

01:45 Drug: GI Cocktail without Donnatal - (Maalox Suspension 30 ml, Lidocaine Liquid 2 % 15  rv  

      ml) Route: PO;                                                                              

03:03 Follow up: Response: Marked relief of symptoms                                          rv  

01:45 Drug: NS 0.9% 1000 ml Route: IV; Rate: 1 bolus; Site: left antecubital;                 rv  

03:03 Follow up: IV Status: Completed infusion; IV Intake: 1000ml                             rv  

01:46 Drug: Zofran 4 mg Route: IVP; Site: left antecubital;                                   rv  

03:03 Follow up: Response: Marked relief of symptoms                                          rv  

01:48 Drug: ProTONIX 40 mg Route: IVP; Site: left antecubital;                                rv  

03:02 Follow up: Response: Marked relief of symptoms                                          rv  

                                                                                                  

                                                                                                  

Intake:                                                                                           

03:03 IV: 1000ml; Total: 1000ml.                                                              rv  

                                                                                                  

Outcome:                                                                                          

02:39 Decision to Hospitalize by Provider.                                                    tw4 

03:34 Admitted to Med/surg accompanied by tech, via stretcher, room 213, with chart, Report   jd3 

      called to  Jonelle VEGAS                                                                        

03:34 Condition: stable                                                                           

03:34 Instructed on the need for admit, Demonstrated understanding of instructions.               

03:55 Patient left the ED.                                                                    fc  

                                                                                                  

Signatures:                                                                                       

Dispatcher [employer]                           EDMS                                                 

Skyla Hammond RN                   RN   fc                                                   

Billy Fallon RN                    RN   jd3                                                  

Collins Sigala MD MD   tw4                                                  

Favian Wilkerson, RN                    RN   rv                                                   

Yu Abel                                 ag3                                                  

Hoda Workman RN                        RN   ca1                                                  

                                                                                                  

Corrections: (The following items were deleted from the chart)                                    

                                                                                             

22:56 22:54 GI: Abdomen is flat, non-distended, Bowel sounds present X 4 quads. Abd is soft   ca1 

      and non tender X 4 quads. ca1                                                               

                                                                                                  

**************************************************************************************************

## 2019-06-08 NOTE — P.HP
Certification for Inpatient


Patient admitted to: Inpatient


With expected LOS: >2 Midnights


Patient will require the following post-hospital care: None


Practitioner: I am a practitioner with admitting privileges, knowledge of 

patient current condition, hospital course, and medical plan of care.


Services: Services provided to patient in accordance with Admission 

requirements found in Title 42 Section 412.3 of the Code of Federal Regulations





Patient History


Date of Service: 06/08/19


Reason for admission: Intractable nausea and vomiting along with diarrhea / 

acute kidney injury


History of Present Illness: 





  Patient is a 38-year-old gentleman with a learning disability who lives with 

his mother and he presents to the hospital with abdominal pain along with nausea

, vomiting, and diarrhea.  This has been going on for the last 3-4 days.  His 

symptoms have gradually worsened so he came into the ER for further evaluation.

  In the emergency room he had lab work done as well as CT of the abdomen and 

pelvis.  This CT revealed that he had no acute surgical abdomen, but he did 

have multiple nodules in the lower lobe of the lung that was captured.  This 

may be related to metastatic disease and a more dedicated CT scan has been 

ordered for today.  Patient's renal function is significantly worsened compared 

to his baseline.  This is probably related to his degree of dehydration.  His 

GFR is less than 20. his blood pressure has been labile.  He will be admitted 

to the hospital for further evaluation.





  Because of his abdominal issues which have led to him developing severe 

dehydration with a GFR less than 20 we need to the admit him to the hospital 

for an inpatient stay.  He is going to need more than 48 hr to recover.  It 

could require even longer time if the CT of the chest that we have ordered 

reveals this to be the cause of his intractable nausea and vomiting along with 

abdominal pain.  If this is the case in his dehydration may last much longer 

than expected.  Patient will need a minimum of 48 hr to recover and to diagnose 

exactly what is going on with the patient.


Allergies





No Known Allergies Allergy (Verified 06/08/19 04:10)


 





Home Medications: 








NK [No Home Meds]  06/08/19 








- Past Medical/Surgical History


Has patient received pneumonia vaccine in the past: No


Diabetic: No


-: obesity


-: illegal drug abuse


-: History of depression


-: Sleep apnea noncompliant


Past Surgical History: Patient denies surgical history





- Family History


  ** Father


Family History: Reviewed- Non-Contributory





- Social History


Smoking Status: Current every day smoker


Alcohol use: No


CD- Drugs: No


Caffeine use: Yes


Place of Residence: Home





Review of Systems


10-point ROS is otherwise unremarkable





Physical Examination





- Vital Signs


Temperature: 97.3 F


Blood Pressure: 126/76


Pulse: 80


Respirations: 20


Pulse Ox (%): 100





- Physical Exam


General: Alert, In no apparent distress, Oriented x3, Other (  Patient appears 

to have a learning disability; family is not at bedside to discuss his current 

situation.)


HEENT: Atraumatic, PERRLA, Mucous membr. moist/pink, EOMI, Sclerae nonicteric


Neck: Supple, 2+ carotid pulse no bruit, No LAD, Without JVD or thyroid 

abnormality


Respiratory: Clear to auscultation bilaterally, Normal air movement


Cardiovascular: Regular rate/rhythm, Normal S1 S2, No murmurs


Gastrointestinal: Normal bowel sounds, No tenderness, No guarding, Distended


Musculoskeletal: No clubbing, No swelling, No tenderness


Integumentary: No rashes


Neurological: Normal gait, Normal speech, Normal strength at 5/5 x4 extr, 

Normal tone, Sensation intact, Cranial nerves 3-12 intact, Normal affect


Lymphatics: No axilla or inguinal lymphadenopathy





- Studies


Laboratory Data (last 24 hrs)





06/07/19 23:11: Creatinine 2.81 H


06/07/19 23:11: WBC 17.9 H, Hgb 15.6, Hct 47.3, Plt Count 277


06/07/19 23:11: Sodium 134 L, Potassium 3.6, BUN 60 H, Creatinine 2.73 H, 

Glucose 167 H, Total Bilirubin 1.2 H, AST 94 H, ALT 40, Alkaline Phosphatase 65

, Lipase 94








Assessment & Plan





- Problems (Diagnosis)


(1) Dehydration


Current Visit: Yes   Status: Acute   





(2) Weight loss


Current Visit: Yes   Status: Acute   





(3) Pulmonary nodules


Current Visit: Yes   Status: Acute   





(4) Metastatic cancer to lung of unknown cell type


Current Visit: Yes   Status: Acute   





(5) Obesity


Onset Date: 01/09/17   Current Visit: No   Status: Acute   


Qualifiers: 


   Obesity type: due to excess calories   Qualified Code(s): E66.01 - Morbid (

severe) obesity due to excess calories   





- Plan





  Plan: 


1. IV fluids 


2. Stool studies


3. GI consultation for further evaluation.  Patient may benefit from a permit 

his screen with this can be done as an outpatient.


4. Pain control


5. Nephrology consultation with hydration and monitoring renal function.  Will 

get a renal ultrasound as well.


6. Repeat abdominal film if pain worsens to rule out perforation


7. GI and DVT prophylaxis 


Discharge Plan: Home


Plan to discharge in: 24 Hours





- Advance Directives


Does patient have a Living Will: No


Does patient have a Durable POA for Healthcare: No





- Code Status/Comfort Care


Code Status Assessed: Yes


Code Status: Full Code


Critical Care: No


Time Spent Managing PTS Care (In Minutes): 45

## 2019-06-09 LAB
ALBUMIN SERPL BCP-MCNC: 3.2 G/DL (ref 3.4–5)
ALP SERPL-CCNC: 43 U/L (ref 45–117)
ALT SERPL W P-5'-P-CCNC: 42 U/L (ref 12–78)
AST SERPL W P-5'-P-CCNC: 76 U/L (ref 15–37)
BUN BLD-MCNC: 23 MG/DL (ref 7–18)
GLUCOSE SERPLBLD-MCNC: 89 MG/DL (ref 74–106)
HCT VFR BLD CALC: 39 % (ref 39.6–49)
LYMPHOCYTES # SPEC AUTO: 1.9 K/UL (ref 0.7–4.9)
PMV BLD: 10.5 FL (ref 7.6–11.3)
POTASSIUM SERPL-SCNC: 4.7 MMOL/L (ref 3.5–5.1)
RBC # BLD: 4.34 M/UL (ref 4.33–5.43)

## 2019-06-09 RX ADMIN — SODIUM CHLORIDE SCH MLS: 0.45 INJECTION, SOLUTION INTRAVENOUS at 10:28

## 2019-06-09 RX ADMIN — SODIUM CHLORIDE SCH: 0.45 INJECTION, SOLUTION INTRAVENOUS at 23:20

## 2019-06-09 RX ADMIN — SODIUM CHLORIDE SCH MLS: 0.45 INJECTION, SOLUTION INTRAVENOUS at 22:13

## 2019-06-09 RX ADMIN — SODIUM CHLORIDE SCH: 0.9 INJECTION, SOLUTION INTRAVENOUS at 03:00

## 2019-06-09 RX ADMIN — ENOXAPARIN SODIUM SCH: 40 INJECTION SUBCUTANEOUS at 08:52

## 2019-06-09 RX ADMIN — SODIUM CHLORIDE SCH MLS: 0.9 INJECTION, SOLUTION INTRAVENOUS at 05:43

## 2019-06-09 RX ADMIN — Medication SCH: at 21:00

## 2019-06-09 RX ADMIN — Medication SCH ML: at 08:51

## 2019-06-09 NOTE — CON
Date of Consultation:  06/08/2019



Chief Complaint:  Abnormal kidney function test.



History Of Present Illness:  The patient came to the hospital and was found to have severe leukocytos
is, sepsis, and acute kidney injury.  Blood work done yesterday revealed azotemia up to BUN 60, creat
inine 2.73.  Over the last 24 hours, renal function somewhat improved, BUN is 49, creatinine 1.62.  E
lectrolytes on admission were as follows:  Sodium 134, potassium 3.6, chloride 98, CO2 24, BUN 60, cr
eatinine 2.73, calcium 9.9.  The patient had CT scan of the abdomen and pelvis done and report is pen
ding at this time. 



The patient is not a good historian.  He is somewhat lethargic and denies new complaints.  He is a 38
-year-old man with learning disabilities, who lives with his mother, and he presented to the hospital
 with abdominal pain along with nausea, vomiting, and diarrhea.  This has been going on for 3-4 days.
  CT scan reveals that he had no acute surgical abdomen, but he did have multiple nodules in the lowe
r lobe of the lung that were captured on the CT scan.  This may be related to metastatic disease, and
 more dedicated CT scan will be ordered today.  Final report is pending and is not available to me.  
I reviewed records from the primary team. 



Renal function somewhat improved over the last 24 hours, but on admission, was significantly worsened
 compared to baseline.  The patient was started on IV fluids to treat hypovolemia.  White count was e
levated and the patient is undergoing workup for possible infection and sepsis.



Review of Systems:

The patient cannot provide review of systems.  He answers a few questions.



Review of Systems:

Unobtainable.



Past Medical History:  Illegal drug abuse, history of depression, sleep apnea, noncompliance.



Family History:  Unobtainable.



Social History:  Current everyday smoker.  Denies alcohol and illicit drugs.



Physical Examination:

Vital Signs:  Blood pressure is 126/76, temperature 97.3, heart rate 80, respiratory rate 20, pulse o
ximeter 100. 

General:  The patient is alert, oriented x3, not in acute distress. 

Eyes:  Anicteric sclerae. 

Neck:  No hemorrhagic changes. 

Respiratory:  Clear to auscultation bilaterally. 

Cardiovascular:  S1, S2.  No pericardial friction rub. 

GI:  Abdomen is soft, benign.  Bowel sounds present. 

Extremities:  No clubbing.  No cyanosis. 

Neurological:  No tremor.



Laboratory Data:  Sodium 133, potassium 3.2, chloride 99, CO2 27, BUN 49, creatinine 1.62, magnesium 
2.8, calcium 9.0, phosphorus 3.7, potassium 3.9.  Urinalysis showed a WBC less than 5, RBC 9, specifi
c gravity 1.025, urine nitrites negative, blood negative, total protein negative.



Impression And Plan:  

1.Acute kidney injury secondary to prerenal azotemia.  Continue IV fluids.  Urinalysis did not show 
active urinary sediment.  CT scan is pending to obtain workup for possible nodules.  I recommend to neeru almaraz renal ultrasound to evaluate kidney size and echotexture.

2.Hypovolemia.  Continue IV fluids.

3.Hypopotassemia.  The patient received replacement for hypopotassemia and potassium level is improv
ing.  Monitor magnesium and phosphorus level.  Avoid nephrotoxic medication.

4.Hypertension.  Recommend to avoid ACE inhibitor in view of acute kidney injury.  Blood pressure cu
rrently is in good control.





ALPA/MODL

DD:  06/08/2019 22:14:09Voice ID:  [id number]

DT:  06/09/2019 03:20:07Report ID:  [id number]

## 2019-06-09 NOTE — P.PN
Subjective


Date of Service: 06/09/19


Chief Complaint: Intractable nausea and vomiting along with diarrhea / acute 

kidney injury


Subjective: Improving





Review of Systems


10-point ROS is otherwise unremarkable





Physical Examination





- Vital Signs


Temperature: 97.0 F


Blood Pressure: 122/72


Pulse: 72


Respirations: 16


Pulse Ox (%): 99





- Physical Exam


General: Alert, In no apparent distress, Oriented x3


HEENT: Atraumatic, PERRLA, EOMI


Neck: Supple, JVD not distended


Respiratory: Clear to auscultation bilaterally, Normal air movement


Cardiovascular: Regular rate/rhythm, Normal S1 S2


Gastrointestinal: Normal bowel sounds, No tenderness


Musculoskeletal: No tenderness


Integumentary: No rashes


Neurological: Normal speech, Normal tone, Normal affect


Lymphatics: No axilla or inguinal lymphadenopathy





Assessment And Plan





- Current Problems (Diagnosis)


(1) Nausea & vomiting


Current Visit: Yes   Status: Acute   





(2) Pulmonary nodules


Current Visit: Yes   Status: Acute   





(3) Metastatic cancer to lung of unknown cell type


Current Visit: Yes   Status: Acute   





(4) Obesity


Onset Date: 01/09/17   Current Visit: No   Status: Acute   


Qualifiers: 


   Obesity type: due to excess calories   Qualified Code(s): E66.01 - Morbid (

severe) obesity due to excess calories   





(5) GERD (gastroesophageal reflux disease)


Current Visit: Yes   Status: Acute   





(6) Chronic back pain


Current Visit: Yes   Status: Acute   





(7) Drug abuse


Current Visit: Yes   Status: Acute   





(8) Nicotine dependence


Current Visit: Yes   Status: Acute   


Qualifiers: 


   Nicotine product type: cigarettes 





(9) History of incarceration


Current Visit: Yes   Status: Acute   





- Plan





1.  Continue IV fluids 


2.  Tolerating diet.  Nausea and vomiting improved.  Continue Protonix


3. GI consultation for further evaluation as an outpatient.


4. Pain control


5.  RAY resolved.  Appreciate Nephrology recommendations.  


6.  CT chest with multiple pulmonary nodules and a high risk patient.  Risk 

factors include history of drug abuse, current nicotine dependence for 10+ years

, recent history of incarceration.  Pulmonology consulted, waiting 

recommendations.


7. GI and DVT prophylaxis

## 2019-06-10 LAB
BUN BLD-MCNC: 10 MG/DL (ref 7–18)
GLUCOSE SERPLBLD-MCNC: 82 MG/DL (ref 74–106)
POTASSIUM SERPL-SCNC: 4.4 MMOL/L (ref 3.5–5.1)

## 2019-06-10 RX ADMIN — ENOXAPARIN SODIUM SCH MG: 40 INJECTION SUBCUTANEOUS at 09:17

## 2019-06-10 RX ADMIN — SODIUM CHLORIDE SCH MLS: 0.45 INJECTION, SOLUTION INTRAVENOUS at 09:18

## 2019-06-10 RX ADMIN — Medication SCH ML: at 09:18

## 2019-06-10 NOTE — PN
Date of Progress Note:  06/09/2019



Chief Complaint:  Acute kidney injury.



History Of Present Illness:  Acute kidney injury, moderately severe.  Renal function has improved giuseppe
ewhat over last 48 hours.  The patient responded to IV fluids.  On arrival to the hospital, BUN was 6
0 and creatinine was 2.73.  CT scan did not show acute abdomen and workup is pending for multiple nod
ules as visualized on the CT scan.



Review of Systems:

Denies complaints.



Physical Examination:

Lungs:  Few crackles at bases. 

Heart:  S1-S2. 

Abdomen:  Soft, benign. 

Extremities:  No edema.



Laboratory Data:  Creatinine 1.62, BUN 49.



Impression And Plan:  

1.Acute kidney injury secondary to prerenal azotemia.  Continue IV fluids.  Adjust fluids according 
to electrolytes.  Monitor for any evidence of nephritis.  Renal ultrasound was ordered to evaluate ki
dney size and echotexture to check for any evidence of chronic kidney disease.  Monitor proteinuria.

2.Hypovolemia.  Continue IV fluids.

3.Hypopotassemia.  Replacement was ordered to treat hypopotassemia.

4.Hypertension.  Monitor blood pressure.  Currently, blood pressure is well controlled.





ALPA/SUNIL

DD:  06/09/2019 20:16:33Voice ID:  [id number]

DT:  06/10/2019 01:35:48Report ID:  [id number]

## 2019-06-10 NOTE — P.DS
Admission Date: 06/08/19


Discharge Date: 06/10/19


Disposition: ROUTINE DISCHARGE


Discharge Condition: FAIR


Reason for Admission: Intractable nausea and vomiting along with diarrhea / 

acute kidney injury


Consultations: 





Dr. Moseley, nephrology


Dr. Maldonado, pulmonology





- Problems


(1) Nausea & vomiting


Current Visit: Yes   Status: Acute   





(2) Pulmonary nodules


Current Visit: Yes   Status: Acute   





(3) Metastatic cancer to lung of unknown cell type


Current Visit: Yes   Status: Acute   





(4) Obesity


Onset Date: 01/09/17   Current Visit: No   Status: Acute   


Qualifiers: 


   Obesity type: due to excess calories   Qualified Code(s): E66.01 - Morbid (

severe) obesity due to excess calories   





(5) GERD (gastroesophageal reflux disease)


Current Visit: Yes   Status: Acute   





(6) Chronic back pain


Current Visit: Yes   Status: Acute   





(7) Drug abuse


Current Visit: Yes   Status: Acute   





(8) Nicotine dependence


Current Visit: Yes   Status: Acute   


Qualifiers: 


   Nicotine product type: cigarettes 





(9) History of incarceration


Current Visit: Yes   Status: Acute   


Brief History of Present Illness: 





Patient is a 38-year-old gentleman with a learning disability who lives with 

his mother and he presents to the hospital with abdominal pain along with nausea

, vomiting, and diarrhea.  This has been going on for the last 3-4 days.  His 

symptoms have gradually worsened so he came into the ER for further evaluation.

  In the emergency room he had lab work done as well as CT of the abdomen and 

pelvis.  This CT revealed that he had no acute surgical abdomen, but he did 

have multiple nodules in the lower lobe of the lung that was captured.  This 

may be related to metastatic disease and a more dedicated CT scan has been 

ordered for today.  Patient's renal function is significantly worsened compared 

to his baseline.  This is probably related to his degree of dehydration.  His 

GFR is less than 20. his blood pressure has been labile.  He will be admitted 

to the hospital for further evaluation.





Hospital Course: 





  Because of his abdominal issues which have led to him developing severe 

dehydration with a GFR less than 20 he was admitted to the hospital for an 

inpatient stay.  He was started on IV fluids.  His acute kidney injury and 

dehydration resolved.  His lab work normalized.  He was slowly started on a 

clear liquid diet, advance as tolerated.  His GI symptoms resolved.  A CT scan 

of his abdomen was done, which was positive for pulmonary nodules noted on the 

pulmonary bases.  This was suspicious for metastasis from elsewhere.  A CT scan 

of the chest was done, consistent with multiple pulmonary nodules, suspicious 

for metastasis.  Pulmonology was consulted.  Case was extensively discussed 

with pulmonologist and radiologist.  Per Radiology, we are unable to do a fine-

needle biopsy here as the nodules are too small, we do not have any way to 

reach them.  This was discussed with pulmonologist.  Pulmonologist stated that 

he would see patient in his clinic for further outpatient workup, including 

biopsy.  This was discussed with the patient as well as is mother.  All 

questions were answered, they verbalized understanding.


Prior to discharge, he was alert and oriented, mentation wise back to baseline.

  He was tolerating a soft diet.  His symptoms have resolved.


He was instructed and recommended to follow up with pulmonology in the next 

week for further evaluation of the pulmonary nodules as he does have risk 

factors including current nicotine dependence for 10+ years, recent history of 

incarceration and history of drug abuse.  He verbalized understanding.  This is 

also reiterated to his mother, she also verbalized understanding.


He was then discharged home in a safe and stable manner.





Vital Signs/Physical Exam: 














Temp Pulse Resp BP Pulse Ox


 


 97.5 F   60   18   105/55 L  97 


 


 06/10/19 12:00  06/10/19 12:00  06/10/19 12:00  06/10/19 12:00  06/10/19 12:00








General: Alert, In no apparent distress, Oriented x3


HEENT: Atraumatic, PERRLA, EOMI


Neck: Supple, JVD not distended


Respiratory: Clear to auscultation bilaterally, Normal air movement


Cardiovascular: Regular rate/rhythm, Normal S1 S2


Gastrointestinal: Normal bowel sounds, No tenderness


Musculoskeletal: No tenderness


Integumentary: No rashes


Neurological: Normal speech, Normal tone, Normal affect


Lymphatics: No axilla or inguinal lymphadenopathy


Laboratory Data at Discharge: 














WBC  8.9 K/uL (4.3-10.9)  D 06/09/19  11:27    


 


Hgb  13.1 g/dL (13.6-17.9)  L D 06/09/19  11:27    


 


Hct  39.0 % (39.6-49.0)  L D 06/09/19  11:27    


 


Plt Count  222 K/uL (152-406)   06/09/19  11:27    


 


Sodium  142 mmol/L (136-145)   06/10/19  05:16    


 


Potassium  4.4 mmol/L (3.5-5.1)   06/10/19  05:16    


 


BUN  10 mg/dL (7-18)   06/10/19  05:16    


 


Creatinine  0.72 mg/dL (0.55-1.3)   06/10/19  05:16    


 


Glucose  82 mg/dL ()   06/10/19  05:16    


 


Phosphorus  3.7 mg/dL (2.5-4.9)   06/08/19  06:08    


 


Magnesium  2.8 mg/dL (1.8-2.4)  H  06/08/19  06:08    


 


Total Bilirubin  0.7 mg/dL (0.2-1.0)   06/09/19  14:00    


 


AST  76 U/L (15-37)  H  06/09/19  14:00    


 


ALT  42 U/L (12-78)   06/09/19  14:00    


 


Alkaline Phosphatase  43 U/L ()  L  06/09/19  14:00    


 


Lipase  94 U/L ()   06/07/19  23:11    








Home Medications: 








Pantoprazole [Protonix Tab*] 40 mg PO DAILYAC #30 tab 06/10/19 





New Medications: 


Pantoprazole [Protonix Tab*] 40 mg PO DAILYAC #30 tab


Patient Discharge Instructions: Please follow up with pulmonology in 1 week.  

Information has been provided to you.  Please follow up with the primary care 

physician in 2-3 days.  Please return to the emergency room for worsening 

symptoms.


Diet: Soft


Activity: Ad madelyn


Time spent managing pt's care (in minutes): 55

## 2019-06-10 NOTE — RAD REPORT
EXAM DESCRIPTION:  US - Renal Ultrasound-Complete - 6/9/2019 11:14 pm

 

CLINICAL HISTORY:  Acute kidney injury

 

COMPARISON:  None.

 

FINDINGS:  The right kidney measures 11.7 x 5.9 x 5.8 cm.  The left kidney measures 10.9 x 6.1 x 5.1 
cm. Renal cortical thickness and echogenicity are normal. No hydronephrosis or suspicious renal mass.


 

No bladder wall thickening or mass. No intraluminal stone or mass.

 

 

IMPRESSION:  No hydronephrosis or suspicious renal mass.

 

No other significant findings.

## 2019-06-10 NOTE — RAD REPORT
EXAM DESCRIPTION:

CT - Abdomen   Pelvis Wo Contrast - 6/8/2019 4:30 am

 

COMPARISON:  None

 

CLINICAL HISTORY:  Abdominal pain

 

TECHNIQUE:  Multiple helical axial images were obtained through the abdomen and pelvis without intrav
enous contrast. Sagittal and coronal reformatted images are reviewed as well.

All CT scans at this facility use dose modulation, iterative reconstruction, and/or weight-based dosi
ng when appropriate to reduce radiation dose to as low as reasonably achievable.

 

FINDINGS:  Lung bases: There are multiple randomly distributed, noncalcified nodules in the visualize
d lung bases measuring up to 1 cm.

Liver: Homogenous attenuation is demonstrated.

Gallbladder/biliary: Gallbladder appears unremarkable. No calcified gallstones. No evidence of biliar
y ductal dilatation.

Pancreas: Unremarkable.

Spleen: Unremarkable.

Adrenals: Unremarkable.

Kidneys and ureters: No evidence of renal or ureteral stones. No hydronephrosis.

Bladder: Unremarkable.

Pelvic organs: Unremarkable.

Bowel: No evidence of bowel obstruction. No bowel wall thickening. Appendix appears unremarkable.

Peritoneum: No free air. No significant free fluid.

Lymph nodes: Unremarkable.

Vasculature: Iliac artery atherosclerosis is noted.

Soft tissues: A small fat-containing umbilical hernia is present.

Bones: Degenerative changes of the lumbar spine are present.

 

IMPRESSION:  1. Multiple pulmonary nodules in the lung bases which can be seen with metastatic diseas
e. Consider follow-up dedicated CT of the chest.

2. No obvious acute process within the abdomen or pelvis.

 

Electronically signed by:   Harshal Pimentel MD   6/8/2019 1:35 AM CDT Workstation: [id number]

 

 

Due to temporary technical issues with the PACS/Fluency reporting system, reports are being signed by
 the in house radiologist as a courtesy to ensure prompt reporting. The interpreting radiologist is f
ully responsible for the content of the report.

## 2019-06-10 NOTE — RAD REPORT
EXAM DESCRIPTION:

CT - Thorax Wo Con - 6/8/2019 4:28 am

 

CLINICAL HISTORY:  The patient is 38 years old and is Male; pneumonia/nodules

 

TECHNIQUE:  Axial computed tomography images of the chest without intravenous contrast.   Sagittal an
d coronal reformatted images were created and reviewed.   This CT exam was performed using one or mor
e of the following dose reduction techniques:   automated exposure control, adjustment of the mA and/
or kV according to patient size, and/or use of iterative reconstruction technique.

 

COMPARISON:  No relevant prior studies available.

 

FINDINGS:  LUNGS:   Multiple bilateral pulmonary nodules are present throughout the lungs, the larges
t within the lingula measures approximately 1.2 cm. The lungs are otherwise clear. There is no lobar 
consolidation.

   PLEURAL SPACE:   Unremarkable.   No pneumothorax.   No significant effusion.

   HEART:   No cardiomegaly.   No pericardial effusion.

   BONES/JOINTS:   Mild multilevel intervertebral disc space narrowing and anterior osteophyte format
ion is present.

   SOFT TISSUES:   The soft tissues are normal.

   VASCULATURE:   Unremarkable.   No thoracic aortic aneurysm.

   LYMPH NODES:   Unremarkable. No enlarged lymph nodes.

 

IMPRESSION:  Innumerable pulmonary nodules throughout the lungs. Findings are most concerning for met
astatic disease.   No evidence to suggest primary on this noncontrasted CT of the chest however.

For low-risk patients recommend follow-up chest CT at 3-6 months.   If unchanged consider an addition
al follow-up CT at 18-24 months.   For high-risk patients (smoking history or other known risk factor
s) initial follow-up chest CT at 3-6 months and if unchanged, 18-24 months.

 

Electronically signed by:   Elyse Santiago MD   6/8/2019 4:25 AM CDT Workstation: [id number]

 

Due to temporary technical issues with the PACS/Fluency reporting system, reports are being signed by
 the in house radiologist as a courtesy to ensure prompt reporting. The interpreting radiologist is f
gavinly responsible for the content of the report.

## 2020-01-21 ENCOUNTER — HOSPITAL ENCOUNTER (EMERGENCY)
Dept: HOSPITAL 97 - ER | Age: 40
Discharge: HOME | End: 2020-01-21
Payer: SELF-PAY

## 2020-01-21 VITALS — SYSTOLIC BLOOD PRESSURE: 128 MMHG | DIASTOLIC BLOOD PRESSURE: 75 MMHG | OXYGEN SATURATION: 100 %

## 2020-01-21 VITALS — TEMPERATURE: 98.4 F

## 2020-01-21 DIAGNOSIS — R07.89: Primary | ICD-10-CM

## 2020-01-21 DIAGNOSIS — F31.9: ICD-10-CM

## 2020-01-21 DIAGNOSIS — I10: ICD-10-CM

## 2020-01-21 DIAGNOSIS — T43.625A: ICD-10-CM

## 2020-01-21 LAB
ALBUMIN SERPL BCP-MCNC: 3.7 G/DL (ref 3.4–5)
ALP SERPL-CCNC: 59 U/L (ref 45–117)
ALT SERPL W P-5'-P-CCNC: 22 U/L (ref 12–78)
AST SERPL W P-5'-P-CCNC: 16 U/L (ref 15–37)
BUN BLD-MCNC: 15 MG/DL (ref 7–18)
GLUCOSE SERPLBLD-MCNC: 109 MG/DL (ref 74–106)
HCT VFR BLD CALC: 40.4 % (ref 39.6–49)
INR BLD: 1.04
LIPASE SERPL-CCNC: 161 U/L (ref 73–393)
LYMPHOCYTES # SPEC AUTO: 2.5 K/UL (ref 0.7–4.9)
MAGNESIUM SERPL-MCNC: 2.3 MG/DL (ref 1.8–2.4)
METHAMPHET UR QL SCN: POSITIVE
NT-PROBNP SERPL-MCNC: 157 PG/ML (ref ?–125)
PMV BLD: 9.8 FL (ref 7.6–11.3)
POTASSIUM SERPL-SCNC: 3.7 MMOL/L (ref 3.5–5.1)
RBC # BLD: 4.6 M/UL (ref 4.33–5.43)
THC SERPL-MCNC: NEGATIVE NG/ML
TROPONIN (EMERG DEPT USE ONLY): < 0.02 NG/ML (ref 0–0.04)

## 2020-01-21 PROCEDURE — 99285 EMERGENCY DEPT VISIT HI MDM: CPT

## 2020-01-21 PROCEDURE — 80320 DRUG SCREEN QUANTALCOHOLS: CPT

## 2020-01-21 PROCEDURE — 96361 HYDRATE IV INFUSION ADD-ON: CPT

## 2020-01-21 PROCEDURE — 71045 X-RAY EXAM CHEST 1 VIEW: CPT

## 2020-01-21 PROCEDURE — 85610 PROTHROMBIN TIME: CPT

## 2020-01-21 PROCEDURE — 96360 HYDRATION IV INFUSION INIT: CPT

## 2020-01-21 PROCEDURE — 83880 ASSAY OF NATRIURETIC PEPTIDE: CPT

## 2020-01-21 PROCEDURE — 83735 ASSAY OF MAGNESIUM: CPT

## 2020-01-21 PROCEDURE — 85730 THROMBOPLASTIN TIME PARTIAL: CPT

## 2020-01-21 PROCEDURE — 81003 URINALYSIS AUTO W/O SCOPE: CPT

## 2020-01-21 PROCEDURE — 80048 BASIC METABOLIC PNL TOTAL CA: CPT

## 2020-01-21 PROCEDURE — 93005 ELECTROCARDIOGRAM TRACING: CPT

## 2020-01-21 PROCEDURE — 83690 ASSAY OF LIPASE: CPT

## 2020-01-21 PROCEDURE — 84484 ASSAY OF TROPONIN QUANT: CPT

## 2020-01-21 PROCEDURE — 80329 ANALGESICS NON-OPIOID 1 OR 2: CPT

## 2020-01-21 PROCEDURE — 36415 COLL VENOUS BLD VENIPUNCTURE: CPT

## 2020-01-21 PROCEDURE — 85025 COMPLETE CBC W/AUTO DIFF WBC: CPT

## 2020-01-21 PROCEDURE — 80307 DRUG TEST PRSMV CHEM ANLYZR: CPT

## 2020-01-21 PROCEDURE — 80076 HEPATIC FUNCTION PANEL: CPT

## 2020-01-21 NOTE — EKG
Test Date:    2020-01-21               Test Time:    02:18:44

Technician:   MT                                     

                                                     

MEASUREMENT RESULTS:                                       

Intervals:                                           

Rate:         83                                     

MA:           156                                    

QRSD:         92                                     

QT:           378                                    

QTc:          444                                    

Axis:                                                

P:            59                                     

MA:           156                                    

QRS:          -49                                    

T:            67                                     

                                                     

INTERPRETIVE STATEMENTS:                                       

                                                     

Normal sinus rhythm with sinus arrhythmia

Left anterior fascicular block

Abnormal ECG

Compared to ECG 01/08/2017 05:19:28

Left anterior fascicular block now present



Electronically Signed On 01-21-20 11:23:50 CST by Pascual Estes

## 2020-01-21 NOTE — RAD REPORT
EXAM DESCRIPTION:  Gregory Single View1/21/2020 2:10 am

 

CLINICAL HISTORY:  Chest pain

 

COMPARISON:  2017

 

FINDINGS:   The lungs appear clear of acute infiltrate. The heart is normal size

 

IMPRESSION:   No acute abnormalities displayed

## 2020-01-21 NOTE — XMS REPORT
Patient Summary Document

 Created on:2020



Patient:AMEE EVANS

Sex:Male

:1980

External Reference #:[id number]





Demographics







 [address]  [address] [place] [address]

 

 Home Phone  [phone]

 

 Work Phone  [phone]

 

 Email Address  NONE

 

 Preferred Language  Unknown

 

 Marital Status  Unknown

 

 Caodaism Affiliation  Unknown

 

 Race  Unknown

 

 Additional Race(s)  Unavailable

 

 Ethnic Group  Unknown









Author







 [facility]  [facility]

 

 Address  [address] [place] [address] [address]



   [place] [address]

 

 Phone  [phone]









Care Team Providers







 Name  Role  Phone

 

 Unavailable  Unavailable  Unavailable









Problems

This patient has no known problems.



Allergies, Adverse Reactions, Alerts

This patient has no known allergies or adverse reactions.



Medications

This patient has no known medications.

## 2020-01-21 NOTE — EDPHYS
Physician Documentation                                                                           

 [facility]                                                                 

Name: Edgar Patel                                                                         

Age: 39 yrs                                                                                       

Sex: Male                                                                                         

: 1980                                                                                   

MRN: [id number]                                                                                   

Arrival Date: 2020                                                                          

Time: 01:56                                                                                       

Account#: [id number]                                                                            

Bed 15                                                                                            

Private MD:                                                                                       

ED Physician Lauri Green                                                                      

HPI:                                                                                              

                                                                                             

02:05 This 39 yrs old  Male presents to ER via Unassigned with complaints of Chest   emilee 

      Pain.                                                                                       

02:05 The patient or guardian reports chest pain that is located primarily in the anterior    emilee 

      chest wall, bilaterally. Onset: 2 hour(s) ago. The pain does not radiate. Associated        

      signs and symptoms: The patient has no apparent associated signs or symptoms. The chest     

      pain is described as aching. Modifying factors: The symptoms are alleviated by nothing.     

      the symptoms are aggravated by nothing. Severity of pain: At its worst the pain was         

      mild in the emergency department the pain is unchanged.                                     

                                                                                                  

Historical:                                                                                       

- Allergies:                                                                                      

02:12 No Known Allergies;                                                                       

- Home Meds:                                                                                      

02:12 Unable to obtain [Active];                                                                

- PMHx:                                                                                           

02:12 Asthma; Bipolar disorder; Diabetes; Hypertension; Sleep Apnea;                          wh  

                                                                                                  

- Immunization history:: Adult Immunizations not up to date.                                      

- Social history:: Smoking status: Patient reports use of chewing tobacco.                        

- Family history:: not pertinent.                                                                 

- Ebola Screening: : Patient negative for fever greater than or equal to 101.5 degrees            

  Fahrenheit, and additional compatible Ebola Virus Disease symptoms Patient denies               

  exposure to infectious person.                                                                  

                                                                                                  

                                                                                                  

ROS:                                                                                              

02:05 Constitutional: Negative for fever, chills, and weight loss, Eyes: Negative for injury, emilee 

      pain, redness, and discharge, ENT: Negative for injury, pain, and discharge, Neck:          

      Negative for injury, pain, and swelling, Respiratory: Negative for shortness of breath,     

      cough, wheezing, and pleuritic chest pain, Abdomen/GI: Negative for abdominal pain,         

      nausea, vomiting, diarrhea, and constipation, Back: Negative for injury and pain, :       

      Negative for injury, bleeding, discharge, and swelling, MS/Extremity: Negative for          

      injury and deformity, Skin: Negative for injury, rash, and discoloration, Neuro:            

      Negative for headache, weakness, numbness, tingling, and seizure, Psych: Negative for       

      depression, anxiety, suicide ideation, homicidal ideation, and hallucinations,              

      Allergy/Immunology: Negative for hives, rash, and allergies, Endocrine: Negative for        

      neck swelling, polydipsia, polyuria, polyphagia, and marked weight changes,                 

      Hematologic/Lymphatic: Negative for swollen nodes, abnormal bleeding, and unusual           

      bruising.                                                                                   

02:05 Cardiovascular: Positive for chest pain.                                                    

                                                                                                  

Exam:                                                                                             

02:05 Constitutional:  This is a well developed, well nourished patient who is awake, alert,  emilee 

      and in no acute distress. Head/Face:  Normocephalic, atraumatic. Eyes:  Pupils equal        

      round and reactive to light, extra-ocular motions intact.  Lids and lashes normal.          

      Conjunctiva and sclera are non-icteric and not injected.  Cornea within normal limits.      

      Periorbital areas with no swelling, redness, or edema. ENT:  Nares patent. No nasal         

      discharge, no septal abnormalities noted.  Tympanic membranes are normal and external       

      auditory canals are clear.  Oropharynx with no redness, swelling, or masses, exudates,      

      or evidence of obstruction, uvula midline.  Mucous membranes moist. Neck:  Trachea          

      midline, no thyromegaly or masses palpated, and no cervical lymphadenopathy.  Supple,       

      full range of motion without nuchal rigidity, or vertebral point tenderness.  No            

      Meningismus. Chest/axilla:  Normal chest wall appearance and motion.  Nontender with no     

      deformity.  No lesions are appreciated. Cardiovascular:  Regular rate and rhythm with a     

      normal S1 and S2.  No gallops, murmurs, or rubs.  Normal PMI, no JVD.  No pulse             

      deficits. Respiratory:  Lungs have equal breath sounds bilaterally, clear to                

      auscultation and percussion.  No rales, rhonchi or wheezes noted.  No increased work of     

      breathing, no retractions or nasal flaring. Abdomen/GI:  Soft, non-tender, with normal      

      bowel sounds.  No distension or tympany.  No guarding or rebound.  No evidence of           

      tenderness throughout. Back:  No spinal tenderness.  No costovertebral tenderness.          

      Full range of motion. Male :  Normal genitalia with no discharge or lesions. Skin:        

      Warm, dry with normal turgor.  Normal color with no rashes, no lesions, and no evidence     

      of cellulitis. MS/ Extremity:  Pulses equal, no cyanosis.  Neurovascular intact.  Full,     

      normal range of motion. Neuro:  Awake and alert, GCS 15, oriented to person, place,         

      time, and situation.  Cranial nerves II-XII grossly intact.  Motor strength 5/5 in all      

      extremities.  Sensory grossly intact.  Cerebellar exam normal.  Normal gait. Psych:         

      Awake, alert, with orientation to person, place and time.  Behavior, mood, and affect       

      are within normal limits.                                                                   

02:05 Musculoskeletal/extremity: DVT Exam: No signs of deep vein thrombosis. no pain, no          

      swelling, no tenderness, negative Homans' sign noted on exam, no appreciated bluish         

      discoloration, no erythema, no increased warmth.                                            

                                                                                                  

Vital Signs:                                                                                      

02:19 Pulse 90; Resp 18; Temp 98.4; Weight 81.65 kg; Height 5 ft. 6 in. (167.64 cm);            

02:27  / 108;                                                                             

02:59  / 93; Pulse 87; Resp 18; Pulse Ox 100% on R/A;                                     

04:00  / 75; Pulse 85; Resp 18; Pulse Ox 100% ;                                           

02:19 Body Mass Index 29.05 (81.65 kg, 167.64 cm)                                               

                                                                                                  

MDM:                                                                                              

02:03 Patient medically screened.                                                             [facility] 

02:09 Data reviewed: vital signs, nurses notes, lab test result(s), EKG, radiologic studies,  emilee 

      plain films.                                                                                

                                                                                                  

                                                                                             

02:04 Order name: Basic Metabolic Panel; Complete Time: 03:44                                 [facility] 

                                                                                             

02:04 Order name: CBC with Diff; Complete Time: 02:45                                         [facility] 

                                                                                             

02:04 Order name: LFT's; Complete Time: 03:44                                                 [facility] 

                                                                                             

02:04 Order name: Magnesium; Complete Time: 03:44                                             [facility] 

                                                                                             

02:04 Order name: NT PRO-BNP; Complete Time: 03:44                                            [facility] 

                                                                                             

02:04 Order name: PT-INR; Complete Time: 02:45                                                [facility] 

                                                                                             

02:04 Order name: Troponin (emerg Dept Use Only); Complete Time: 03:44                        [facility] 

                                                                                             

02:04 Order name: Acetaminophen; Complete Time: 03:44                                         [facility] 

                                                                                             

02:04 Order name: ETOH Level; Complete Time: 02:45                                            [facility] 

                                                                                             

02:04 Order name: Ptt, Activated; Complete Time: 02:45                                        [facility] 

                                                                                             

02:04 Order name: Salicylate; Complete Time: 03:15                                            [facility] 

                                                                                             

02:04 Order name: Urine Drug Screen; Complete Time: 03:15                                     [facility] 

                                                                                             

02:29 Order name: Urine Dipstick--Ancillary (enter results); Complete Time: 02:45             [facility] 

                                                                                             

02:04 Order name: XRAY Chest (1 view)                                                         [facility] 

                                                                                             

02:04 Order name: EKG; Complete Time: 02:05                                                   [facility] 

                                                                                             

02:04 Order name: Cardiac monitoring; Complete Time: 02:22                                    [facility] 

                                                                                             

02:04 Order name: EKG - Nurse/Tech; Complete Time: 02:22                                      [facility] 

                                                                                             

02:04 Order name: IV Saline Lock; Complete Time: 02:22                                        [facility] 

                                                                                             

02:04 Order name: Labs collected and sent; Complete Time: 02:                               [facility] 

                                                                                             

02:04 Order name: O2 Per Protocol; Complete Time: 02:                                       [facility] 

                                                                                             

02:04 Order name: O2 Sat Monitoring; Complete Time: 02:                                     [facility] 

                                                                                             

02:04 Order name: Urine Dipstick-Ancillary (obtain specimen); Complete Time: 02:            [facility] 

                                                                                             

03:08 Order name: Lipase; Complete Time: 03:44                                                [facility]

                                                                                                  

Administered Medications:                                                                         

02:26 Drug: NS 0.9% 1000 ml Route: IV; Rate: 1 bolus; Site: left antecubital;                 hb  

04:06 Follow up: Response: No adverse reaction; IV Status: Completed infusion                   

02:26 Drug: Aspirin Chewable Tablet 324 mg Route: PO;                                         hb  

04:06 Follow up: Response: No adverse reaction                                                  

                                                                                                  

                                                                                                  

Disposition:                                                                                      

20 03:44 Discharged to Home. Impression: Other chest pain - non cardiac, Essential          

  (primary) hypertension, Bipolar disorder, Adverse effect of amphetamines.                       

- Condition is Stable.                                                                            

- Discharge Instructions: Stimulant Use Disorder-Amphetamines, Nonspecific Chest Pain,            

  Hypertension, Nonspecific Chest Pain, Easy-to-Read, Hypertension, Easy-to-Read,                 

  Stimulant Use Disorder-Methamphetamines, Aspirin and Your Heart.                                

                                                                                                  

- Medication Reconciliation Form, Thank You Letter, Antibiotic Education, Prescription            

  Opioid Use form.                                                                                

- Follow up: Private Physician; When: 2 - 3 days; Reason: Recheck today's complaints,             

  Continuance of care, Re-evaluation by your physician. Follow up: Pascual Estes; When:           

  2 - 3 days; Reason: Recheck today's complaints, Continuance of care, Re-evaluation by           

  your physician.                                                                                 

- Problem is new.                                                                                 

- Symptoms have improved.                                                                         

                                                                                                  

                                                                                                  

                                                                                                  

Signatures:                                                                                       

Dispatcher [employer]                           [facility]                                                 

Lauri Green MD MD cha Baxter, Heather, RN                     RN   Nuria Richards                                                                                   

                                                                                                  

Corrections: (The following items were deleted from the chart)                                    

03:07 02:45 LIPASE+C.LAB.BRZ ordered. [facility]

03:45 03:44 2020 03:44 Discharged to Home. Impression: Other chest pain - non cardiac;  emilee 

      Essential (primary) hypertension. Condition is Stable. Discharge Instructions:              

      Nonspecific Chest Pain, Nonspecific Chest Pain, Easy-to-Read, Aspirin and Your Heart,       

      Hypertension, Hypertension, Easy-to-Read. Forms are Medication Reconciliation Form,         

      Thank You Letter, Antibiotic Education, Prescription Opioid Use. Follow up: Private         

      Physician; When: 2 - 3 days; Reason: Recheck today's complaints, Continuance of care,       

      Re-evaluation by your physician. Follow up: Pascual Estes; When: 2 - 3 days; Reason:        

      Recheck today's complaints, Continuance of care, Re-evaluation by your physician.           

      Problem is new. Symptoms have improved. [facility]                                                 

04:07 03:45 2020 03:44 Discharged to Home. Impression: Other chest pain - non cardiac;    

      Essential (primary) hypertension; Bipolar disorder; Adverse effect of amphetamines.         

      Condition is Stable. Discharge Instructions: Nonspecific Chest Pain, Nonspecific Chest      

      Pain, Easy-to-Read, Aspirin and Your Heart, Hypertension, Hypertension, Easy-to-Read.       

      Forms are Medication Reconciliation Form, Thank You Letter, Antibiotic Education,           

      Prescription Opioid Use. Follow up: Private Physician; When: 2 - 3 days; Reason:            

      Recheck today's complaints, Continuance of care, Re-evaluation by your physician.           

      Follow up: Pascual Estes; When: 2 - 3 days; Reason: Recheck today's complaints,             

      Continuance of care, Re-evaluation by your physician. Problem is new. Symptoms have         

      improved. emilee                                                                               

                                                                                                  

**************************************************************************************************

## 2020-01-21 NOTE — ER
Nurse's Notes                                                                                     

 [facility]                                                                 

Name: Edgar Patel                                                                         

Age: 39 yrs                                                                                       

Sex: Male                                                                                         

: 1980                                                                                   

MRN: [id number]                                                                                   

Arrival Date: 2020                                                                          

Time: 01:56                                                                                       

Account#: [id number]                                                                            

Bed 15                                                                                            

Private MD:                                                                                       

Diagnosis: Other chest pain-non cardiac;Essential (primary) hypertension;Bipolar disorder;Adverse 

  effect of amphetamines                                                                          

                                                                                                  

Presentation:                                                                                     

                                                                                             

02:07 Presenting complaint: Pt is in [employer] custody on their way to book him when Pt wh  

      C/O chest pain. Transition of care: patient was not received from another setting of        

      care. Onset of symptoms was 2020. Risk Assessment: Do you want to hurt          

      yourself or someone else? Patient reports no desire to harm self or others. Initial         

      Sepsis Screen: Does the patient meet any 2 criteria? No. Patient's initial sepsis           

      screen is negative. Does the patient have a suspected source of infection? No.              

      Patient's initial sepsis screen is negative. Care prior to arrival: None.                   

02:07 Method Of Arrival: [employer]: [facility]  

02:07 Acuity: MADI 3                                                                           wh  

                                                                                                  

Historical:                                                                                       

- Allergies:                                                                                      

02:12 No Known Allergies;                                                                     wh  

- Home Meds:                                                                                      

02:12 Unable to obtain [Active];                                                              wh  

- PMHx:                                                                                           

02:12 Asthma; Bipolar disorder; Diabetes; Hypertension; Sleep Apnea;                          wh  

                                                                                                  

- Immunization history:: Adult Immunizations not up to date.                                      

- Social history:: Smoking status: Patient reports use of chewing tobacco.                        

- Family history:: not pertinent.                                                                 

- Ebola Screening: : Patient negative for fever greater than or equal to 101.5 degrees            

  Fahrenheit, and additional compatible Ebola Virus Disease symptoms Patient denies               

  exposure to infectious person.                                                                  

                                                                                                  

                                                                                                  

Screenin:12 Abuse screen: Denies threats or abuse. Denies injuries from another. Nutritional        wh  

      screening: No deficits noted. Tuberculosis screening: No symptoms or risk factors           

      identified. Fall Risk None identified.                                                      

                                                                                                  

Assessment:                                                                                       

02:15 General: Appears in no apparent distress. Behavior is drowsy. Pain: Complains of pain   wh  

      in chest Pain does not radiate. Pain currently is 4 out of 10 on a pain scale. Pain         

      began 30 min ago. Neuro: Level of Consciousness is awake, alert, obeys commands,            

      Oriented to person, place, time, situation, Appropriate for age. Cardiovascular:            

      Reports chest pain, Heart tones S1 S2 Rhythm is regular. Respiratory: Airway is patent      

      Respiratory effort is even, unlabored, Respiratory pattern is regular, symmetrical,         

      Breath sounds are clear bilaterally. GI: Abdomen is round non-distended. : No signs       

      and/or symptoms were reported regarding the genitourinary system. EENT: No signs and/or     

      symptoms were reported regarding the EENT system. Derm: Skin is intact, is healthy with     

      good turgor, Skin is pink, warm \T\ dry. normal. Musculoskeletal: Circulation, motion,      

      and sensation intact.                                                                       

02:59 Reassessment: Patient appears in no apparent distress at this time. No changes from       

      previously documented assessment. Patient and/or family updated on plan of care and         

      expected duration. Pain level reassessed. Patient is alert, oriented x 3, equal             

      unlabored respirations, skin warm/dry/pink.                                                 

04:04 Reassessment: Patient appears in no apparent distress at this time. No changes from       

      previously documented assessment. Patient and/or family updated on plan of care and         

      expected duration. Pain level reassessed. Patient is alert, oriented x 3, equal             

      unlabored respirations, skin warm/dry/pink. Patient denies pain at this time.               

                                                                                                  

Vital Signs:                                                                                      

02:19 Pulse 90; Resp 18; Temp 98.4; Weight 81.65 kg; Height 5 ft. 6 in. (167.64 cm);            

02:27  / 108;                                                                           wh  

02:59  / 93; Pulse 87; Resp 18; Pulse Ox 100% on R/A;                                   wh  

04:00  / 75; Pulse 85; Resp 18; Pulse Ox 100% ;                                           

02:19 Body Mass Index 29.05 (81.65 kg, 167.64 cm)                                               

                                                                                                  

ED Course:                                                                                        

01:56 Patient arrived in ED.                                                                  es  

02:03 Lauri Green MD is Attending Physician.                                             emilee 

02:05 Nuria Edwards is Primary Nurse.                                                           

02:10 XRAY Chest (1 view) In Process Unspecified.                                             EDMS

02:10 Triage completed.                                                                       wh  

02:18 Arm band placed on right wrist.                                                         wh  

02:19 Patient has correct armband on for positive identification. Bed in low position. Call     

      light in reach. Side rails up X 1. Cardiac monitor on. Pulse ox on. NIBP on.                

02:19 Patient maintains SpO2 saturation greater than 95% on room air.                         wh  

02:27 Urine collected: clean catch specimen, cloudy, sandra colored. Inserted saline lock: 20  mt  

      gauge in left antecubital area, using aseptic technique. Blood collected.                   

02:29 EKG done, by ED staff, reviewed by Lauri Green MD.                                   mt  

03:44 Pascual Estes MD is Referral Physician.                                               [facility] 

04:06 No provider procedures requiring assistance completed. IV discontinued, intact,           

      bleeding controlled, No redness/swelling at site.                                           

                                                                                                  

Administered Medications:                                                                         

02:26 Drug: NS 0.9% 1000 ml Route: IV; Rate: 1 bolus; Site: left antecubital;                 hb  

04:06 Follow up: Response: No adverse reaction; IV Status: Completed infusion                   

02:26 Drug: Aspirin Chewable Tablet 324 mg Route: PO;                                         hb  

04:06 Follow up: Response: No adverse reaction                                                  

                                                                                                  

                                                                                                  

Outcome:                                                                                          

03:44 Discharge ordered by MD.                                                                [facility] 

04:06 Discharged to [employer] Enforcement                                                             

04:06 Condition: stable                                                                           

04:06 Discharge instructions given to patient, police, Instructed on discharge instructions,      

      follow up and referral plans. POC Demonstrated understanding of instructions, follow-up     

      care, POC                                                                                   

04:07 Patient left the ED.                                                                      

                                                                                                  

Signatures:                                                                                       

Dispatcher [employer]                           EDLauri Minor MD MD cha Salyer, Corazon Underwood, [employer]                     RN   bob Jara, Nuria Jack mt                                                                                   

                                                                                                  

**************************************************************************************************

## 2020-12-11 ENCOUNTER — HOSPITAL ENCOUNTER (EMERGENCY)
Dept: HOSPITAL 97 - ER | Age: 40
Discharge: HOME | End: 2020-12-11
Payer: COMMERCIAL

## 2020-12-11 DIAGNOSIS — S90.02XA: Primary | ICD-10-CM

## 2020-12-11 DIAGNOSIS — I10: ICD-10-CM

## 2020-12-11 DIAGNOSIS — Z72.0: ICD-10-CM
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
## 2020-12-11 PROCEDURE — 96372 THER/PROPH/DIAG INJ SC/IM: CPT

## 2020-12-11 PROCEDURE — 99284 EMERGENCY DEPT VISIT MOD MDM: CPT
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
## 2020-12-11 NOTE — XMS REPORT
Continuity of Care Document

                          Created on:2020



Patient:AMEE EVANS

Sex:Male

:1980

External Reference #:[id number]





Demographics







                          Address                   [address]



                                                    [place] [address]

 

                          Home Phone                [phone]

 

                          Work Phone                [phone]

 

                          Preferred Language        Unknown

 

                          Marital Status            Unknown

 

                          [employer] Affiliation     Unknown

 

                          Race                      Unknown

 

                          Additional Race(s)        Unavailable

 

                          Ethnic Group              Unknown









Author







                          Organization              [facility]

t

 

                          Address                   [address] [place] [address] [address]. [place] [place] [id number]

 

                          Phone                     [phone]









Care Team Providers







                    Name                Role                Phone

 

                    Unavailable         Unavailable         Unavailable









Problems

This patient has no known problems.



Allergies, Adverse Reactions, Alerts

This patient has no known allergies or adverse reactions.



Medications

This patient has no known medications.



Procedures

This patient has no known procedures.



Results

This patient has no known results.

## 2020-12-12 NOTE — RAD REPORT
EXAM DESCRIPTION:  RAD - Foot Left 3 View - 12/11/2020 10:10 pm

 

CLINICAL HISTORY:  Left Foot pain

 

FINDINGS:  No fracture or dislocation is seen.
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
## 2020-12-16 VITALS — SYSTOLIC BLOOD PRESSURE: 137 MMHG | DIASTOLIC BLOOD PRESSURE: 89 MMHG | TEMPERATURE: 98.5 F | OXYGEN SATURATION: 98 %

## 2021-11-18 ENCOUNTER — HOSPITAL ENCOUNTER (EMERGENCY)
Dept: HOSPITAL 97 - ER | Age: 41
Discharge: HOME | End: 2021-11-18
Payer: SELF-PAY
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
## 2021-11-18 DIAGNOSIS — S63.521A: Primary | ICD-10-CM

## 2021-11-18 DIAGNOSIS — I10: ICD-10-CM

## 2021-11-18 PROCEDURE — 99283 EMERGENCY DEPT VISIT LOW MDM: CPT

## 2021-11-18 NOTE — XMS REPORT
Continuity of Care Document

                          Created on:2021



Patient:AMEE EVANS

Sex:Male

:1980

External Reference #:[id number]





Demographics







                          Address                   [address]



                                                    [place] [address]

 

                          Home Phone                [phone]

 

                          Work Phone                [phone]

 

                          Mobile Phone              [phone]

 

                          Email Address             DECLINED

 

                          Preferred Language        en

 

                          Marital Status            Unknown

 

                          Yarsanism Affiliation     Unknown

 

                          Race                      Unknown

 

                          Additional Race(s)        White

 

                          Ethnic Group              Unknown









Author







                          Organization              [facility]

t

 

                          Address                   [address]



                                                    [place] [address]

 

                          Phone                     [phone]









Support







                Name            Relationship    Address         Phone

 

                L Jorge Nunes Child           [address]  [phone]

[phone]



                                                [place] [address] 

 

                Hoda        Sibling         [address]   [phone]



                                                [place] [address] 









Care Team Providers







                    Name                Role                Phone

 

                    Matt CONNIE            Primary Care Physician [phone]

 

                    Emory VEGAS,  A       Attending Clinician Unavailable

 

                    Singer OCONNOR           Attending Clinician [phone]

 

                    Jason DO           Attending Clinician [phone]

 

                    HANH                  Attending Clinician Unavailable

 

                    Jason OCONNOR           Admitting Clinician [phone]









Payers







           Payer Name Policy Type Policy Number Effective Date Expiration Date MAYITO wolfe

 

           [employer]            [id number]  2019            



           CARE                             00:00:00              

 

           [employer]            [id number]  2016            



                                            00:00:00              







Problems







       Condition Condition Condition Status Onset  Resolution Last   Treating Co

mments 

Source



       Name   Details Category        Date   Date   Treatment Clinician        



                                                 Date                 

 

       SBO (small SBO (small Disease Active 2021                             U

nivers



       bowel  bowel                0-24                               ity of



       obstructio obstructio               00:00:                             [place]

[place]



       n)     n)                   00                                 Medical



                                                                      Branch

 

       Ventral Ventral Disease Active                              Univers



       hernia hernia               7-12                               ity of



       with bowel with bowel               00:00:                             Te

[place]



       obstructio obstructio               00                                 Me

dical



       n      n                                                       Branch

 

       Morbid Morbid Disease Active                              Univers



       obesity obesity               7-12                               ity of



       with body with body               00:00:                             [place]

[place]



       mass index mass index               00                                 Me

dical



       of     of                                                      Branch



       40.0-49.9 40.0-49.9                                                  

 

       Obesity Obesity Disease Active                              Univers



                                                                  ity of



                                   00:00:                             [place]



                                   00                                 Medical



                                                                      Branch

 

       Family Family Disease Active                              Univers



       history of history of                                              it

y of



       polyps in polyps in               00:00:                             [place]

[place]



       the colon the colon                                                [facility]

 

       Tobacco Tobacco Disease Active                              Univers



       use    use                                                 ity of



                                   00:00:                             [place]



                                   00                                 [facility]

 

       Blood in Blood in Disease Active                              Unive

rs



       stool  stool                                               ity of



                                   00:00:                             [place]



                                                                    [facility]

 

       Esophageal Esophageal Disease Active                              U

nivers



       reflux reflux                                              ity of



                                   00:00:                             [place]



                                   00                                 [facility]







Allergies, Adverse Reactions, Alerts







       Allergy Allergy Status Severity Reaction(s) Onset  Inactive Treating Comm

ents 

Source



       Name   Type                        Date   Date   Clinician        

 

       NO KNOWN Drug   Active                                           Univers



       ALLERGIE Class                                                   ity of



       S                                                              [facility]







Social History







           Social Habit Start Date Stop Date  Quantity   Comments   Source

 

           History of                       Cigarette Smoker            Universi

ty of



           tobacco use                                             [facility]

 

           History SDOH                                             University o

f



           Alcohol Std                                             [place] [facility]



           Drinks                                                 Branch

 

           History SDOH                                             University o

f



           Alcohol Binge                                             [place] [facility]

al



                                                                  Branch

 

           History SDOH                                             University o

f



           Alcohol Comment                                             [place] [facility]

ical



                                                                  Branch

 

           Exposure to                       Not sure              University of



           SARS-CoV-2                                             [facility]



           (event)                                                Branch

 

           Alcohol intake 2021-10-24 2021-10-24 Lifetime              University

 of



                      00:00:00   00:00:00   non-drinker            [facility]



                                            (finding)             [place]

 

           Tobacco Comment 2021 pt lethargic            Univers

ity of



                      00:00:00   00:00:00                         [facility]

 

           History SDOH 2019-10-10 2019-10-10 1                     University o

f



           Alcohol Frequency 00:00:00   00:00:00                         [facility]

 

           Sex Assigned At 1980                       Universit

y of



           Birth      00:00:00   00:00:00                         [facility]









                Smoking Status  Start Date      Stop Date       Source

 

                Current every day smoker                                 Univers

ity of [facility]







Medications







       Ordered Filled Start  Stop   Current Ordering Indication Dosage Frequency

 Signature

                    Comments            Components          Source



     Medication Medication Date Date Medication? Clinician                (SIG) 

          



     Name Name                                                   

 

     morpHINE      2021- No             4mg       4 mg, Slow           Un

faheem



     injection 4      0-25 10-25                          IV Push,           ity

 of



     mg        04:00: 02:50                          ONCE, 1           [place]



               00   :00                           dose, On           Medical



                                                  Sun            Branch



                                                  10/24/21           



                                                  at 2300,           



                                                  STAT           

 

     ondansetron      2021- No             4mg       4 mg, Slow          

 Univers



     (ZOFRAN      0-25 10-25                          IV Push,           ity of



     (PF))      03:45: 02:50                          ONCE, 1           [place]



     injection 4      00   :00                           dose, On           Medi

amaury



     mg                                           [place]            Branch



                                                  10/24/21           



                                                  at 2245,           



                                                  Routine           

 

     iohexol      2021- No        [id number] 120mL      120 mL,           

Univers



     (OMNIPAQUE      0-25 10-25                          Intravenou           it

y of



     350       03:15: 03:05                          s, ONCE, 1           [place]



     BULK-100      00   :00                           dose, On           Medical



     mL)                                          [facility]



     injection                                         10/24/21           



     120 mL                                         at 2215,           



                                                  Routine           

 

     NaCl 0.9%      2021- No             500mL      at 999           Univ

ers



     (NS) bolus      0-25 10-25                          mL/hr, 500           it

y of



     infusion      02:45: 04:10                          mL, IV           [place]



     500 mL      00   :00                           Infusion,           Medical



                                                  ONCE, 1           Branch



                                                  dose, On           



                                                  Sun            



                                                  10/24/21           



                                                  at 2145,           



                                                  STAT           

 

     pantoprazol      2019      Yes       [id number] 40mg      Take 1          

 Univers



     e 40 mg EC      1-21                               tablet by           ity 

of



     tablet      00:00:                               mouth           [place]



               00                                 daily.           Medical



                                                                 Branch

 

     ibuprofen      2019      Yes       [id number] 800mg      Take 1           

Univers



     800 mg      1-21                               tablet by           ity of



     tablet      00:00:                               mouth           [place]



               00                                 every 6           Medical



                                                  (six)           Branch



                                                  hours as           



                                                  needed for           



                                                  Pain           



                                                  (scale           



                                                  4-6).           

 

     pantoprazol      2019      Yes       [id number] 40mg      Take 1          

 Univers



     e 40 mg EC      1-21                               tablet by           ity 

of



     tablet      00:00:                               mouth           [place]



               00                                 daily.           Medical



                                                                 Branch

 

     ibuprofen      2019      Yes       [id number] 800mg      Take 1           

Univers



     800 mg      1-21                               tablet by           ity of



     tablet      00:00:                               mouth           [place]



               00                                 every 6           Medical



                                                  (six)           Branch



                                                  hours as           



                                                  needed for           



                                                  Pain           



                                                  (scale           



                                                  4-6).           







Immunizations







           Ordered    Filled Immunization Date       Status     Comments   [facility]

e



           Immunization Name Name                                        

 

           Pneumococcal            2019-10-16 Completed             [facility]

f



           Polysaccharide,            00:00:00                         [place] [facility]

ical



           PPSV23 (PNEUMOVAX)                                             Branch

 

           TDAP                  2019-10-16 Completed             [facility]



                                 00:00:00                         [facility]

 

           Pneumococcal            2019-10-16 Completed             [facility]

f



           Polysaccharide,            00:00:00                         [place] [facility]

ical



           PPSV23 (PNEUMOVAX)                                             Branch

 

           TDAP                  2019-10-16 Completed             University of



                                 00:00:00                         [place] [facility]



                                                                  [place]

 

           Influenza Virus            2019-10-10 Completed             Universit

y of



           Vaccine Quad .5 mL            00:00:00                         [place] 

Medical



           IM 6+ MO                                               Branch

 

           Influenza Virus            2019-10-10 Completed             Universit

y of



           Vaccine Quad .5 mL            00:00:00                         [place] 

Medical



           IM 6+ MO                                               Branch







Vital Signs







             Vital Name   Observation Time Observation Value Comments     Source

 

             Systolic blood 2021-10-25 04:00:00 145 mm[Hg]                Univer

sity of



             pressure                                            [facility]

 

             Diastolic blood 2021-10-25 04:00:00 97 mm[Hg]                 [facility]

 

             Heart rate   2021-10-25 04:00:00 98 /min                   [facility]

 

             Respiratory rate 2021-10-25 04:00:00 28 /min                   [facility]

 

             Oxygen saturation in 2021-10-25 04:00:00 96 /min                   

[facility]



             Arterial blood by                                        [place] [facility]

ama[facility]



             Pulse oximetry                                        [place]

 

             Body height  2021-10-25 02:43:00 167.6 cm                  [facility]

 

             Body weight  2021-10-25 02:43:00 120.203 kg                [facility]

 

             BMI          2021-10-25 02:43:00 42.77 kg/m2               [facility]

 

             Body temperature 2021-10-25 02:41:00 35.78 Rosa                 [facility]







Procedures







                Procedure       Date / Time Performed Performing Clinician Sourc

e

 

                COVID-19 (ID NOW 2021-10-25 03:52:00 Lion Antonio [facility]



                RAPID TESTING)                                  Medical [place]

 

                CT ABDOMEN PELVIS W 2021-10-25 03:09:28 Lion Antonio [facility]

ty of [place]



                CONTRAST                                        [facility]

 

                CBC WITH DIFF   2021-10-25 02:48:00 Lion Antonio [facility] o

[facility]

 

                LIPASE          2021-10-25 02:48:00 Singer, [facility] o

[facility]

 

                COMP. METABOLIC PANEL 2021-10-25 02:48:00 [facility]

[facility]



                ([id number])                                         Medical [place]

 

                CONSENT/REFUSAL FOR 2021-10-25 02:34:10 Doctor Unassigned, No Un

[facility]



                DIAGNOSIS AND                   Name            Medical Branch



                TREATMENT                                       







Encounters







        Start   End     Encounter Admission Attending Care    Care    Encounter 

Source



        Date/Time Date/Time Type    Type    Clinicians Facility Department ID   

   

 

        2021         Emergency                 [facility]    [id number] 

Univers



        09:14:32                                                         ity of



                                                                        [facility]

 

        2021         Emergency                 [facility]    [id number] 

Univers



        07:21:37                                                         [facility]

 

        2021-10-26 2021-10-26 Transition         Yosef Cat  [id number]

[id number] Univers



        00:00:00 00:00:00 of Care         Myke Epstein   [id number]         

ity of



                                                [place]   [id number]         [place]

s



                                                        [id number]         Medi

amaury



                                                        [address]             Branch

 

        2021-10-24 2021-10-25 [facility]         Lion Antonio [facility]    [id number]

[id number] 

Univers



        21:36:00 00:04:00 Encounter         Jason Darinel Clraissa [id number]  

       ity of



                                                [place] [id number]         [place]

s



                                                [place]  [id number]         Medi

amaury



                                                        [address]             Branch

 

        2021 Outpatient LUCIUS SESAY [facility]    [id number]

[id number] Univers



        14:00:00 14:00:00                                         [id number]  [facility]

 

        2021 Outpatient LUCIUS SESAY [facility]    [id number]

[id number] Univers



        14:00:00 14:00:00                                                 [facility]







Results







           Test Description Test Time  Test Comments Results    Result Comments 

Source









                    COMP. METABOLIC PANEL ([id number]) 2021-10-25 03:40:15 









                      Test Item  Value      Reference Range Interpretation Comme

nts









             NA (test code = 3144884360) 139 mmol/L   135-145                   

 

             K (test code = 3144818681) 4.2 mmol/L   3.5-5.0                   

 

             CL (test code = 0903749519) 103 mmol/L                       

 

             CO2 TOTAL (test code = 8555368452) 25 mmol/L    23-31              

       

 

             AGAP (test code = 1438552145)              2-16                    

  

 

             BUN (test code = 0658410347) 22 mg/dL     7-23                     

 

 

             GLUCOSE (test code = 7166633511) 130 mg/dL           H       

     

 

             CREATININE (test code = 1.04 mg/dL   0.60-1.25                 



             [id number])                                         

 

             TOTAL BILI (test code = 0.7 mg/dL    0.1-1.1                   



             [id number])                                         

 

             CALCIUM (test code = 2316826540) 10.8 mg/dL   8.6-10.6     H       

     

 

             T PROTEIN (test code = 3548860178) 8.1 g/dL     6.3-8.2            

       

 

             ALBUMIN (test code = 7148255235) 4.6 g/dL     3.5-5.0              

     

 

             ALK PHOS (test code = 5502011488) 58 U/L                     

      

 

             ALTv (test code = 1742-6) 28 U/L       5-50                      

 

             AST(SGOT) (test code = 4731849856) 30 U/L       13-40              

       

 

             eGFR (test code = 3496726204)              mL/min/1.73m2           

   

 

             MARTHA (test code = MARTHA) [employer] of Glomerular                    

       



                          Filtration Rate (GFR) and Staging                     

      



                          of Kidney Disease*                           



                          +-----------------------+--------                     

      



                          -------------+-------------------                     

      



                          ------+| GFR (mL/min/1.73 m2) ?|                      

     



                          With Kidney Damage ?| ?Without                        

   



                          Kidney                                 



                          Damage+-----------------------+--                     

      



                          -------------------+-------------                     

      



                          ------------+| ?>90 ? ? ? ? ? ? ?                     

      



                          ? ?| ?Stage one ? ? ? ? ?| ?                          

 



                          Normal ? ? ? ? ? ? ?                           



                          ?+-----------------------+-------                     

      



                          --------------+------------------                     

      



                          -------+| ?60-89 ? ? ? ? ? ? ? ?|                     

      



                          ?Stage two ? ? ? ? ?| ? Decreased                     

      



                          GFR ? ? ? ?                            



                          +-----------------------+--------                     

      



                          -------------+-------------------                     

      



                          ------+| ?30-59 ? ? ? ? ? ? ? ?|                      

     



                          ?Stage three ? ? ? ?| ? Stage                         

  



                          three ? ? ? ? ?                           



                          +-----------------------+--------                     

      



                          -------------+-------------------                     

      



                          ------+| ?15-29 ? ? ? ? ? ? ? ?|                      

     



                          ?Stage four ? ? ? ? | ? Stage                         

  



                          four ? ? ? ? ?                           



                          ?+-----------------------+-------                     

      



                          --------------+------------------                     

      



                          -------+| ?<15 (or dialysis) ? ?|                     

      



                          ?Stage five ? ? ? ? | ? Stage                         

  



                          five ? ? ? ? ?                           



                          ?+-----------------------+-------                     

      



                          --------------+------------------                     

      



                          -------+ *Each stage assumes the                      

     



                          associated GFR level has been in                      

     



                          effect for at least three months.                     

      



                          ?Stages 1 to 5, with or without                       

    



                          kidney disease, indicate chronic                      

     



                          kidney disease. Notes:                           



                          Determination of stages one and                       

    



                          two (with eGFR >59mL/min/1.73 m2)                     

      



                          requires estimation of kidney                         

  



                          damage for at least three months                      

     



                          as defined by structural or                           



                          functional abnormalities of the                       

    



                          kidney, manifested by                           



                          either:Pathological abnormalities                     

      



                          or Markers of kidney damage                           



                          (including abnormalities in the                       

    



                          composition of the blood or urine                     

      



                          or abnormalities in imaging                           



                          tests).                                

 

             Lab Interpretation (test code = Abnormal                           

    



             47493-0)                                            



[facility]





             Test Item    Value        Reference Range Interpretation Comments

 

             LIPASE (test code = 3817906778) 87 U/L       0-220                 

    

 

             Lab Interpretation (test code = Normal                             

    



             62967-8)                                            



[facility]21[facility]25 03:00:34





             Test Item    Value        Reference Range Interpretation Comments

 

             WBC (test code =              See_Comment  H             [Automated



             6690-2)                                             message] The sy

stem



                                                                 which generated



                                                                 this result



                                                                 transmitted



                                                                 reference range

:



                                                                 4.20 - 10.70



                                                                 10*3/?L. The



                                                                 reference range

 was



                                                                 not used to



                                                                 interpret this



                                                                 result as



                                                                 normal/abnormal

.

 

             RBC (test code =              See_Comment                [Automated



             789-8)                                              message] The sy

stem



                                                                 which generated



                                                                 this result



                                                                 transmitted



                                                                 reference range

:



                                                                 4.26 - 5.52



                                                                 10*6/?L. The



                                                                 reference range

 was



                                                                 not used to



                                                                 interpret this



                                                                 result as



                                                                 normal/abnormal

.

 

             HGB (test code = 15.9 g/dL    12.2-16.4                 



             718-7)                                              

 

             HCT (test code = 47.8 %       38.4-49.3                 



             4544-3)                                             

 

             MCV (test code = 88.5 fL      81.7-95.6                 



             787-2)                                              

 

             MCH (test code = 29.4 pg      26.1-32.7                 



             785-6)                                              

 

             MCHC (test code = 33.3 g/dL    31.2-35.0                 



             786-4)                                              

 

             RDW-SD (test code = 44.8 fL      38.5-51.6                 



             89186-7)                                            

 

             RDW-CV (test code = 13.7 %       12.1-15.4                 



             788-0)                                              

 

             PLT (test code =              See_Comment                [Automated



             777-3)                                              message] The sy

stem



                                                                 which generated



                                                                 this result



                                                                 transmitted



                                                                 reference range

:



                                                                 150 - 328 10*3/

?L.



                                                                 The reference r

jaime



                                                                 was not used to



                                                                 interpret this



                                                                 result as



                                                                 normal/abnormal

.

 

             MPV (test code = 10.8 fL      9.8-13.0                  



             80141-7)                                            

 

             NRBC/100 WBC (test              See_Comment                [Automat

ed



             code = [id number])                                        message] 

The system



                                                                 which generated



                                                                 this result



                                                                 transmitted



                                                                 reference range

:



                                                                 0.0 - 10.0 /100



                                                                 WBCs. The refer

ence



                                                                 range was not u

sed



                                                                 to interpret th

is



                                                                 result as



                                                                 normal/abnormal

.

 

             NRBC x10^3 (test code <0.01        See_Comment                [Auto

mated



             = [id number])                                        message] The s

ystem



                                                                 which generated



                                                                 this result



                                                                 transmitted



                                                                 reference range

:



                                                                 10*3/?L. The



                                                                 reference range

 was



                                                                 not used to



                                                                 interpret this



                                                                 result as



                                                                 normal/abnormal

.

 

             GRAN MAT (NEUT) % 78.0 %                                 



             (test code = 770-8)                                        

 

             IMM GRAN % (test code 0.60 %                                 



             = [id number])                                        

 

             LYMPH % (test code = 12.5 %                                 



             736-9)                                              

 

             MONO % (test code = 6.8 %                                  



             5905-5)                                             

 

             EOS % (test code = 1.4 %                                  



             713-8)                                              

 

             BASO % (test code = 0.7 %                                  



             706-2)                                              

 

             GRAN MAT x10^3(ANC) 8.74 10*3/uL 1.99-6.95    H            



             (test code =                                        



             [id number])                                         

 

             IMM GRAN x10^3 (test 0.07 10*3/uL 0.00-0.06    H            



             code = [id number])                                        

 

             LYMPH x10^3 (test code 1.40 10*3/uL 1.09-3.23                 



             = 731-0)                                            

 

             MONO x10^3 (test code 0.76 10*3/uL 0.36-1.02                 



             = 742-7)                                            

 

             EOS x10^3 (test code = 0.16 10*3/uL 0.06-0.53                 



             711-2)                                              

 

             BASO x10^3 (test code 0.08 10*3/uL 0.01-0.09                 



             = 704-7)                                            

 

             Lab Interpretation Abnormal                               



             (test code = 78984-8)                                        



[facility]

## 2021-11-18 NOTE — ER
Nurse's Notes                                                                                     

 [facility]                                                                 

Name: Edgar Patel                                                                         

Age: 41 yrs                                                                                       

Sex: Male                                                                                         

: 1980                                                                                   

MRN: [id number]                                                                                   

Arrival Date: 2021                                                                          

Time: 12:39                                                                                       

Account#: [id number]                                                                            

Bed 12                                                                                            

Private MD:                                                                                       

Diagnosis: Sprain of radiocarpal joint of left wrist                                              

                                                                                                  

Presentation:                                                                                     

                                                                                             

12:43 Chief complaint: Patient states: fell on right hand yesterday, has pain and swelling to iw  

      right hand and wrist. Coronavirus screen: At this time, the client does not indicate        

      any symptoms associated with coronavirus-19. Ebola Screen: Patient negative for fever       

      greater than or equal to 101.5 degrees Fahrenheit, and additional compatible Ebola          

      Virus Disease symptoms Patient denies exposure to infectious person. Patient denies         

      travel to an Ebola-affected area in the 21 days before illness onset. No symptoms or        

      risks identified at this time. Initial Sepsis Screen: Does the patient meet any 2           

      criteria? No. Patient's initial sepsis screen is negative. Does the patient have a          

      suspected source of infection? No. Patient's initial sepsis screen is negative. Risk        

      Assessment: Do you want to hurt yourself or someone else? Patient reports no desire to      

      harm self or others. Onset of symptoms was 2021.                               

12:43 Method Of Arrival: Ambulatory                                                           iw  

12:43 Acuity: MADI 4                                                                           iw  

                                                                                                  

Historical:                                                                                       

- Allergies:                                                                                      

12:44 No Known Allergies;                                                                     iw  

- Home Meds:                                                                                      

12:44 None [Active];                                                                          iw  

- PMHx:                                                                                           

12:44 Asthma; Bipolar disorder; Diabetes; Hypertension; Sleep Apnea;                          iw  

- PSHx:                                                                                           

12:44 None;                                                                                   iw  

                                                                                                  

- Immunization history:: Client reports receiving the 2nd dose of the Covid vaccine.              

- Social history:: Smoking status: Patient reports the use of cigarette tobacco                   

  products, smokes one pack cigarettes per day.                                                   

                                                                                                  

                                                                                                  

Screenin:00 Abuse screen: Denies threats or abuse. Nutritional screening: No deficits noted.        tw2 

      Tuberculosis screening: No symptoms or risk factors identified. Fall Risk None              

      identified.                                                                                 

                                                                                                  

Assessment:                                                                                       

13:05 General: Appears in no apparent distress. Behavior is calm, cooperative, appropriate    tw2 

      for age. Pain: Denies pain. Neuro: Level of Consciousness is awake, alert, obeys            

      commands, Oriented to person, place, time, situation. Respiratory: Airway is patent         

      Respiratory effort is even, unlabored, Respiratory pattern is regular, symmetrical.         

      Musculoskeletal: Circulation, motion, and sensation intact. Range of motion: intact in      

      all extremities, Swelling present in right hand.                                            

13:26 Reassessment: xray is at bedside at this time.                                          tw2 

13:48 Reassessment: Patient appears in no apparent distress at this time. No changes from     tw2 

      previously documented assessment. Patient is alert, oriented x 3, equal unlabored           

      respirations, skin warm/dry/pink.                                                           

                                                                                                  

Vital Signs:                                                                                      

12:43  / 100; Pulse 79; Resp 16; Temp 97.8; Pulse Ox 99% on R/A; Weight 120.2 kg;       iw  

      Height 5 ft. 2 in. (157.48 cm); Pain 8/10;                                                  

12:43 Body Mass Index 48.47 (120.20 kg, 157.48 cm)                                            iw  

                                                                                                  

ED Course:                                                                                        

12:39 Patient arrived in ED.                                                                  as  

12:44 Triage completed.                                                                       iw  

12:45 Arm band placed on.                                                                     iw  

12:45 Bed in low position. Call light in reach.                                               tw2 

12:51 Brian Reed PA is PHCP.                                                               jr8 

12:51 Parveen Murry MD is Attending Physician.                                              jr8 

13:00 Shaneka Carbajal RN is Primary Nurse.                                                        tw2 

13:31 XRAY Wrist RIGHT 3 view In Process Unspecified.                                         EDMS

13:48 No provider procedures requiring assistance completed. Patient did not have IV access   tw2 

      during this emergency room visit.                                                           

                                                                                                  

Administered Medications:                                                                         

No medications were administered                                                                  

                                                                                                  

                                                                                                  

Outcome:                                                                                          

13:44 Discharge ordered by MD.                                                                jr8 

13:48 Discharged to home ambulatory.                                                          tw2 

13:48 Condition: stable                                                                           

13:48 Discharge instructions given to patient, Instructed on discharge instructions, follow       

      up and referral plans. Demonstrated understanding of instructions, follow-up care.          

13:49 Patient left the ED.                                                                    tw2 

                                                                                                  

Signatures:                                                                                       

Dispatcher [employer]                           Chetna Leon Irene, RN                     RN                                                      

Brian Reed PA                        PA   jr8                                                  

Shaneka Carbajal RN                          RN   tw2                                                  

                                                                                                  

**************************************************************************************************

## 2021-11-18 NOTE — RAD REPORT
EXAM DESCRIPTION:  RAD - Wrist Right 3 View - 11/18/2021 1:31 pm

 

CLINICAL HISTORY:  PAIN

 

COMPARISON:  No comparisons

 

FINDINGS/IMPRESSION:  No acute fracture. No malalignment. Radiocarpal joint space narrowing.

## 2021-11-18 NOTE — EDPHYS
Physician Documentation                                                                           

 [facility]                                                                 

Name: Edgar Patel                                                                         

Age: 41 yrs                                                                                       

Sex: Male                                                                                         

: 1980                                                                                   

MRN: [id number]                                                                                   

Arrival Date: 2021                                                                          

Time: 12:39                                                                                       

Account#: [id number]                                                                            

Bed 12                                                                                            

Private MD:                                                                                       

ED Physician Parveen Murry                                                                       

HPI:                                                                                              

                                                                                             

13:16 This 41 yrs old Male presents to ER via Ambulatory with complaints of Hand Swelling.    jr8 

13:16 Onset: The symptoms/episode began/occurred acutely, 2 day(s) ago. Modifying factors:    jr8 

      The symptoms are alleviated by nothing, the symptoms are aggravated by movement.            

      Associated signs and symptoms: The patient has no apparent associated signs or              

      symptoms. Severity of symptoms: At their worst the symptoms were moderate, in the           

      emergency department the symptoms are unchanged. The patient has not experienced            

      similar symptoms in the past. The patient has not recently seen a physician. This is a      

      41-year-old male patient that presented to the emergency room with complaints of right      

      wrist and hand pain. Patient stated that he fell backward trying to catch himself with      

      his right hand. Since then has had moderate pain with decreased range of motion             

      primarily to the right wrist. Denies any other injury or symptoms at this time..            

                                                                                                  

Historical:                                                                                       

- Allergies:                                                                                      

12:44 No Known Allergies;                                                                     iw  

- Home Meds:                                                                                      

12:44 None [Active];                                                                          iw  

- PMHx:                                                                                           

12:44 Asthma; Bipolar disorder; Diabetes; Hypertension; Sleep Apnea;                          iw  

- PSHx:                                                                                           

12:44 None;                                                                                   iw  

                                                                                                  

- Immunization history:: Client reports receiving the 2nd dose of the Covid vaccine.              

- Social history:: Smoking status: Patient reports the use of cigarette tobacco                   

  products, smokes one pack cigarettes per day.                                                   

                                                                                                  

                                                                                                  

ROS:                                                                                              

13:16 Eyes: Negative for injury, pain, redness, and discharge, ENT: Negative for injury,      jr8 

      pain, and discharge, Neck: Negative for injury, pain, and swelling, Cardiovascular:         

      Negative for chest pain, palpitations, and edema, Respiratory: Negative for shortness       

      of breath, cough, wheezing, and pleuritic chest pain, Abdomen/GI: Negative for              

      abdominal pain, nausea, vomiting, diarrhea, and constipation, Back: Negative for injury     

      and pain, Skin: Negative for injury, rash, and discoloration, Neuro: Negative for           

      headache, weakness, numbness, tingling, and seizure.                                        

13:16 MS/extremity: Positive for decreased range of motion, pain, tenderness, of the Right        

      wrist.                                                                                      

                                                                                                  

Exam:                                                                                             

13:16 Cardiovascular:  Regular rate and rhythm with a normal S1 and S2.  No gallops, murmurs, jr8 

      or rubs.  Normal PMI, no JVD.  No pulse deficits. Respiratory:  Lungs have equal breath     

      sounds bilaterally, clear to auscultation and percussion.  No rales, rhonchi or wheezes     

      noted.  No increased work of breathing, no retractions or nasal flaring. Skin:  Warm,       

      dry with normal turgor.  Normal color with no rashes, no lesions, and no evidence of        

      cellulitis. Neuro:  Awake and alert, GCS 15, oriented to person, place, time, and           

      situation.  Cranial nerves II-XII grossly intact.  Motor strength 5/5 in all                

      extremities.  Sensory grossly intact.                                                       

13:16 Musculoskeletal/extremity: Extremities: grossly normal except: noted in the right hand:     

      And has mild to moderate tenderness to the dorsal right wrist with decreased range of       

      motion secondary to pain.  Sensation intact and is able to flex and extend his hand.        

      Pulses 2+ to affected extremity.  Remainder of unaffected extremities unremarkable..        

                                                                                                  

Vital Signs:                                                                                      

12:43  / 100; Pulse 79; Resp 16; Temp 97.8; Pulse Ox 99% on R/A; Weight 120.2 kg;       iw  

      Height 5 ft. 2 in. (157.48 cm); Pain 8/10;                                                  

12:43 Body Mass Index 48.47 (120.20 kg, 157.48 cm)                                            iw  

                                                                                                  

MDM:                                                                                              

12:51 Patient medically screened.                                                             jr8 

13:16 Data reviewed: vital signs, nurses notes, radiologic studies, plain films. Data         jr8 

      interpreted: Pulse oximetry: on room air is 99 %. Interpretation: normal. Counseling: I     

      had a detailed discussion with the patient and/or guardian regarding: the historical        

      points, exam findings, and any diagnostic results supporting the discharge/admit            

      diagnosis, the need for outpatient follow up, a family practitioner.                        

                                                                                                  

                                                                                             

13:12 Order name: XRAY Wrist RIGHT 3 view; Complete Time: 13:48                               jr8 

                                                                                                  

Administered Medications:                                                                         

No medications were administered                                                                  

                                                                                                  

                                                                                                  

Disposition:                                                                                      

17:25 Co-signature as Attending Physician, Parveen Murry MD I agree with the assessment and   kdr 

      plan of care.                                                                               

                                                                                                  

Disposition Summary:                                                                              

21 13:44                                                                                    

Discharge Ordered                                                                                 

      Location: Home                                                                          jr8 

      Problem: new                                                                            jr8 

      Symptoms: have improved                                                                 jr8 

      Condition: Stable                                                                       jr8 

      Diagnosis                                                                                   

        - Sprain of radiocarpal joint of left wrist                                           jr8 

      Followup:                                                                               jr8 

        - With: Private Physician                                                                  

        - When: 2 - 3 days                                                                         

        - Reason: Recheck today's complaints, Continuance of care, Re-evaluation by your           

      physician                                                                                   

      Discharge Instructions:                                                                     

        - Wrist Sprain, Adult                                                                 jr8 

        - Discharge Summary Sheet                                                             tw2 

      Forms:                                                                                      

        - Medication Reconciliation Form                                                      jr8 

        - Thank You Letter                                                                    jr8 

        - Antibiotic Education                                                                jr8 

        - Prescription Opioid Use                                                             jr8 

        - Work release form                                                                   tw2 

Signatures:                                                                                       

Dispatcher [employer]                           EDMS                                                 

Parveen Murry MD MD kdr Williams, Irene, RN                     RN   iw                                                   

Brian Reed PA PA   jr8                                                  

                                                                                                  

**************************************************************************************************

## 2022-01-01 ENCOUNTER — HOSPITAL ENCOUNTER (EMERGENCY)
Dept: HOSPITAL 97 - ER | Age: 42
LOS: 1 days | Discharge: HOME | End: 2022-01-02
Payer: SELF-PAY

## 2022-01-01 DIAGNOSIS — J15.9: Primary | ICD-10-CM

## 2022-01-01 DIAGNOSIS — F17.210: ICD-10-CM

## 2022-01-01 DIAGNOSIS — Z20.822: ICD-10-CM

## 2022-01-01 LAB — SARS-COV-2 RNA RESP QL NAA+PROBE: NEGATIVE

## 2022-01-01 PROCEDURE — 99284 EMERGENCY DEPT VISIT MOD MDM: CPT

## 2022-01-01 PROCEDURE — 0240U: CPT

## 2022-01-01 PROCEDURE — 71046 X-RAY EXAM CHEST 2 VIEWS: CPT

## 2022-01-01 NOTE — XMS REPORT
Continuity of Care Document

                           Created on:2022



Patient:AMEE EVANS

Sex:Male

:1980

External Reference #:[id number]





Demographics







                          Address                   [address]



                                                    [place] [address]

 

                          Home Phone                [phone]

 

                          Work Phone                [phone]

 

                          Mobile Phone              [phone]

 

                          Email Address             [address]

 

                          Preferred Language        en

 

                          Marital Status            Unknown

 

                          Anglican Affiliation     Unknown

 

                          Race                      Unknown

 

                          Additional Race(s)        White

 

                          Ethnic Group              Unknown









Author







                          Organization              [facility]

t

 

                          Address                   [address]



                                                    [place] [address]

 

                          Phone                     [phone]









Support







                Name            Relationship    Address         Phone

 

                ALVARADO Nunes Child           [address]  [phone]

[phone]



                                                [place] [address] 

 

                Simms        Sibling         [address]   [phone]



                                                [place] [address] 









Care Team Providers







                    Name                Role                Phone

 

                    Matt ROSALES            Primary Care Physician [phone]

 

                    Emory VEGAS,  A       Attending Clinician Unavailable

 

                    Singer DO           Attending Clinician [phone]

 

                    Jason DO           Attending Clinician [phone]

 

                    HANH                  Attending Clinician Unavailable

 

                    Jason OCONNOR           Admitting Clinician [phone]









Payers







           Payer Name Policy Type Policy Number Effective Date Expiration Date MAYITO wolfe

 

           [employer]            [id number]  2019            



           CARE                             00:00:00              

 

           [employer]            [id number]  [id number]            



                                            00:00:00              







Problems







       Condition Condition Condition Status Onset  Resolution Last   Treating Co

mments 

Source



       Name   Details Category        Date   Date   Treatment Clinician        



                                                 Date                 

 

       SBO (small SBO (small Disease Active 2021                             U

nivers



       bowel  bowel                0-24                               ity of



       obstructio obstructio               00:00:                             [place]

[place]



       n)     n)                   00                                 Medical



                                                                      Branch

 

       Ventral Ventral Disease Active                              Univers



       hernia hernia               7-12                               ity of



       with bowel with bowel               00:00:                             Te

[place]



       obstructio obstructio               00                                 Me

dical



       n      n                                                       Branch

 

       Morbid Morbid Disease Active                              Univers



       obesity obesity               7-12                               ity of



       with body with body               00:00:                             [place]

[place]



       mass index mass index               00                                 Me

dical



       of     of                                                      Branch



       40.0-49.9 40.0-49.9                                                  

 

       Obesity Obesity Disease Active                              Univers



                                                                  ity of



                                   00:00:                             [place]



                                   00                                 Medical



                                                                      Branch

 

       Family Family Disease Active                              Univers



       history of history of                                              it

y of



       polyps in polyps in               00:00:                             [place]

[place]



       the colon the colon                                                [facility]

 

       Tobacco Tobacco Disease Active                              Univers



       use    use                                                 ity of



                                   00:00:                             [place]



                                   00                                 [facility]

 

       Blood in Blood in Disease Active                              Unive

rs



       stool  stool                                               ity of



                                   00:00:                             [place]



                                   00                                 Medical



                                                                      Branch

 

       Esophageal Esophageal Disease Active                              U

nivers



       reflux reflux                                              ity of



                                   00:00:                             [place]



                                   00                                 [facility]







Allergies, Adverse Reactions, Alerts







       Allergy Allergy Status Severity Reaction(s) Onset  Inactive Treating Comm

ents 

Source



       Name   Type                        Date   Date   Clinician        

 

       NO KNOWN Drug   Active                                           Univers



       ALLERGIE Class                                                   ity of



       S                                                              [facility]







Social History







           Social Habit Start Date Stop Date  Quantity   Comments   Source

 

           History of                       Cigarette Smoker            Universi

ty of



           tobacco use                                             [place] [facility]



                                                                  [place]

 

           History SDOH                                             University o

f



           Alcohol Std                                             [place] [facility]



           Drinks                                                 Branch

 

           History SDOH                                             University o

f



           Alcohol Binge                                             [place] [facility]

al



                                                                  Branch

 

           History SDOH                                             University o

f



           Alcohol Comment                                             [place] [facility]

ical



                                                                  Branch

 

           Exposure to                       Not sure              University of



           SARS-CoV-2                                             [facility]



           (event)                                                Branch

 

           Alcohol intake 2021-10-24 2021-10-24 Lifetime              University

 of



                      00:00:00   00:00:00   non-drinker            [facility]



                                            (finding)             [place]

 

           Tobacco Comment 2021 pt lethargic            Univers

ity of



                      00:00:00   00:00:00                         [facility]

 

           History SDOH 2019-10-10 2019-10-10 1                     University o

f



           Alcohol Frequency 00:00[facility]00   00:00:00                         [facility]



                                                                  [place]

 

           Sex Assigned At 1980                       Universit

y of



           Birth      00:00:00   00:00:00                         [facility]









                Smoking Status  Start Date      Stop Date       Source

 

                Current every day smoker                                 Univers

ity of [facility]







Medications







       Ordered Filled Start  Stop   Current Ordering Indication Dosage Frequency

 Signature

                    Comments            Components          Source



     Medication Medication Date Date Medication? Clinician                (SIG) 

          



     Name Name                                                   

 

     morpHINE      2021 No             4mg       4 mg, Slow           Un

faheem



     injection 4      0-25 10-25                          IV Push,           ity

 of



     mg        04:00: 02:50                          ONCE, 1           [place]



               00   :00                           dose, On           Medical



                                                  Sun            Branch



                                                  10/24/21           



                                                  at 2300,           



                                                  STAT           

 

     ondansetron      2021- No             4mg       4 mg, Slow          

 Univers



     (ZOFRAN      0-25 10-25                          IV Push,           ity of



     (PF))      03:45: 02:50                          ONCE, 1           [place]



     injection 4      00   :00                           dose, On           Medi

amaury



     mg                                           [facility]



                                                  10/24/21           



                                                  at 2245,           



                                                  Routine           

 

     iohexol      2021- No        [id number] 120mL      120 mL,           

Univers



     (OMNIPAQUE      0-25 10-25                          Intravenou           it

y of



     350       03:15: 03:05                          s, ONCE, 1           [place]



     BULK-100      00   :00                           dose, On           Medical



     mL)                                          [facility]



     injection                                         10/24/21           



     120 mL                                         at 2215,           



                                                  Routine           

 

     NaCl 0.9%      2021- No             500mL      at 999           Univ

ers



     (NS) bolus      0-25 10-25                          mL/hr, 500           it

y of



     infusion      02:45: 04:10                          mL, IV           [place]



     500 mL      00   :00                           Infusion,           Medical



                                                  ONCE, 1           Branch



                                                  dose, On           



                                                  Sun            



                                                  10/24/21           



                                                  at 2145,           



                                                  STAT           

 

     pantoprazol      2019      Yes       [id number] 40mg      Take 1          

 Univers



     e 40 mg EC      1-21                               tablet by           ity 

of



     tablet      00:00:                               mouth           [place]



               00                                 daily.           Medical



                                                                 Branch

 

     ibuprofen      2019      Yes       [id number] 800mg      Take 1           

Univers



     800 mg      1-21                               tablet by           ity of



     tablet      00:00:                               mouth           [place]



               00                                 every 6           Medical



                                                  (six)           Branch



                                                  hours as           



                                                  needed for           



                                                  Pain           



                                                  (scale           



                                                  4-6).           

 

     pantoprazol      2019      Yes       [id number] 40mg      Take 1          

 Univers



     e 40 mg EC      1-21                               tablet by           ity 

of



     tablet      00:00:                               mouth           [place]



               00                                 daily.           Medical



                                                                 Branch

 

     ibuprofen      2019      Yes       [id number] 800mg      Take 1           

Univers



     800 mg      1-21                               tablet by           ity of



     tablet      00:00:                               mouth           [place]



               00                                 every 6           Medical



                                                  (six)           Branch



                                                  hours as           



                                                  needed for           



                                                  Pain           



                                                  (scale           



                                                  4-6).           







Immunizations







           Ordered    Filled Immunization Date       Status     Comments   [facility]

e



           Immunization Name Name                                        

 

           Pneumococcal            2019-10-16 Completed             [place] [facility]

f



           Polysaccharide,            00:00:00                         [place] [facility]

ical



           PPSV23 (PNEUMOVAX)                                             Branch

 

           TDAP                  2019-10-16 Completed             University of



                                 00:00:00                         [facility]

 

           Pneumococcal            2019-10-16 Completed             [place] o

f



           Polysaccharide,            00:00:00                         [place] [facility]

ical



           PPSV23 (PNEUMOVAX)                                             Branch

 

           TDAP                  2019-10-16 Completed             University of



                                 00:00:00                         [facility]

 

           Influenza Virus            2019-10-10 Completed             Universit

y of



           Vaccine Quad .5 mL            00:00:00                         [place] 

Medical



           IM 6+ MO                                               Branch

 

           Influenza Virus            2019-10-10 Completed             Universit

y of



           Vaccine Quad .5 mL            00:00:00                         [facility]



           IM 6+ MO                                               Branch







Vital Signs







             Vital Name   Observation Time Observation Value Comments     Source

 

             Systolic blood 2021-10-25 04:00:00 145 mm[Hg]                Univer

sity of



             pressure                                            [facility]

 

             Diastolic blood 2021-10-25 04:00:00 97 mm[Hg]                 [facility]

 

             Heart rate   2021-10-25 04:00:00 98 /min                   [facility]

 

             Respiratory rate 2021-10-25 04:00:00 28 /min                   [facility]

 

             Oxygen saturation in 2021-10-25 04:00:00 96 /min                   

[facility]



             Arterial blood by                                        [place] [facility]

ama[facility]



             Pulse oximetry                                        [place]

 

             Body height  2021-10-25 02:43:00 167.6 cm                  [facility]

 

             Body weight  2021-10-25 02:43:00 120.203 kg                [facility]

 

             BMI          2021-10-25 02:43:00 42.77 kg/m2               [facility]

 

             Body temperature 2021-10-25 02:41:00 35.78 Rosa                 [facility]







Procedures







                Procedure       Date / Time Performed Performing Clinician Manny

e

 

                COVID-19 (ID NOW 2021-10-25 03:52:00 Singer, [facility]



                RAPID TESTING)                                  [facility]

 

                CT ABDOMEN PELVIS W 2021-10-25 03:09:28 Lion Antonio Universi

ty of [place]



                CONTRAST                                        [facility]

 

                CBC WITH DIFF   2021-10-25 02:48:00 Singer, Lion [place] o

[facility]

 

                LIPASE          2021-10-25 02:48:00 Singer, [facility]

 

                COMP. METABOLIC PANEL 2021-10-25 02:48:00 Lion Antonio

[facility]



                ([id number])                                         [facility]

 

                CONSENT/REFUSAL FOR 2021-10-25 02:34:10 Doctor Unassigned, No Un

[facility]



                DIAGNOSIS AND                   Name            Medical Branch



                TREATMENT                                       







Encounters







        Start   End     Encounter Admission Attending Care    Care    Encounter 

Source



        Date/Time Date/Time Type    Type    Clinicians Facility Department ID   

   

 

        2021         Emergency                 [facility]    [id number] 

Univers



        09:14:32                                                         ity [facility]

 

        2021         Emergency                 [facility]    [id number] 

Univers



        07:21:37                                                         ity [facility]

 

        2021-10-26 2021-10-26 Transition         Yosef Cat  [id number]

[id number] Univers



        00:00:00 00:00:00 of Care         Myke Epstein   [id number]         

ity of



                                                [place]   [id number]         [place]

s



                                                        [phone]         Medi

amaury



                                                        [address]             Branch

 

        2021-10-24 2021-10-25 [facility]         Lion Antonio [facility]    [id number]

[id number] 

Univers



        21:36:00 00:04:00 Encounter         Jason Darinel Clarissa [id number]  

       ity of



                                                [place] [id number]         [place]

[facility]  [id number]         Medi

amaury



                                                        084             Branch

 

        2021 Outpatient LUCIUS SESAY [facility]    [id number]

[id number] Univers



        14:00:00 14:00:00                                         [id number]  [facility]

 

        2021 Outpatient LUCIUS SESAY [facility]    [id number]

[id number] Univers



        14:00:00 14:00:00                                                 [facility]







Results







           Test Description Test Time  Test Comments Results    Result Comments 

Source









                    COMP. METABOLIC PANEL ([id number]) 2021-10-25 03:40:15 









                      Test Item  Value      Reference Range Interpretation Comme

nts









             NA (test code = 8524380304) 139 mmol/L   135-145                   

 

             K (test code = 6415735314) 4.2 mmol/L   3.5-5.0                   

 

             CL (test code = 7906258084) 103 mmol/L                       

 

             CO2 TOTAL (test code = 0473939626) 25 mmol/L    23-31              

       

 

             AGAP (test code = 6316554531)              2-16                    

  

 

             BUN (test code = 5580824034) 22 mg/dL     7-23                     

 

 

             GLUCOSE (test code = 5092254887) 130 mg/dL           H       

     

 

             CREATININE (test code = 1.04 mg/dL   0.60-1.25                 



             [id number])                                         

 

             TOTAL BILI (test code = 0.7 mg/dL    0.1-1.1                   



             [id number])                                         

 

             CALCIUM (test code = 7525318068) 10.8 mg/dL   8.6-10.6     H       

     

 

             T PROTEIN (test code = 0099255475) 8.1 g/dL     6.3-8.2            

       

 

             ALBUMIN (test code = 4715523679) 4.6 g/dL     3.5-5.0              

     

 

             ALK PHOS (test code = 9813583636) 58 U/L                     

      

 

             ALTv (test code = 1742-6) 28 U/L       5-50                      

 

             AST(SGOT) (test code = 5266307486) 30 U/L       13-40              

       

 

             eGFR (test code = 9610603056)              mL/min/1.73m2           

   

 

             MARTHA (test code = MARTHA) Association of Glomerular                    

       



                          Filtration Rate (GFR) and Staging                     

      



                          of Kidney Disease*                           



                          +-----------------------+--------                     

      



                          -------------+-------------------                     

      



                          ------+| GFR (mL/min/1.73 m2) ?|                      

     



                          With Kidney Damage ?| ?Without                        

   



                          Kidney                                 



                          Damage+-----------------------+--                     

      



                          -------------------+-------------                     

      



                          ------------+| ?>90 ? ? ? ? ? ? ?                     

      



                          ? ?| ?Stage one ? ? ? ? ?| ?                          

 



                          Normal ? ? ? ? ? ? ?                           



                          ?+-----------------------+-------                     

      



                          --------------+------------------                     

      



                          -------+| ?60-89 ? ? ? ? ? ? ? ?|                     

      



                          ?Stage two ? ? ? ? ?| ? Decreased                     

      



                          GFR ? ? ? ?                            



                          +-----------------------+--------                     

      



                          -------------+-------------------                     

      



                          ------+| ?30-59 ? ? ? ? ? ? ? ?|                      

     



                          ?Stage three ? ? ? ?| ? Stage                         

  



                          three ? ? ? ? ?                           



                          +-----------------------+--------                     

      



                          -------------+-------------------                     

      



                          ------+| ?15-29 ? ? ? ? ? ? ? ?|                      

     



                          ?Stage four ? ? ? ? | ? Stage                         

  



                          four ? ? ? ? ?                           



                          ?+-----------------------+-------                     

      



                          --------------+------------------                     

      



                          -------+| ?<15 (or dialysis) ? ?|                     

      



                          ?Stage five ? ? ? ? | ? Stage                         

  



                          five ? ? ? ? ?                           



                          ?+-----------------------+-------                     

      



                          --------------+------------------                     

      



                          -------+ *Each stage assumes the                      

     



                          associated GFR level has been in                      

     



                          effect for at least three months.                     

      



                          ?Stages 1 to 5, with or without                       

    



                          kidney disease, indicate chronic                      

     



                          kidney disease. Notes:                           



                          Determination of stages one and                       

    



                          two (with eGFR >59mL/min/1.73 m2)                     

      



                          requires estimation of kidney                         

  



                          damage for at least three months                      

     



                          as defined by structural or                           



                          functional abnormalities of the                       

    



                          kidney, manifested by                           



                          either:Pathological abnormalities                     

      



                          or Markers of kidney damage                           



                          (including abnormalities in the                       

    



                          composition of the blood or urine                     

      



                          or abnormalities in imaging                           



                          tests).                                

 

             Lab Interpretation (test code = Abnormal                           

    



             55044-1)                                            



[facility]





             Test Item    Value        Reference Range Interpretation Comments

 

             LIPASE (test code = 0898682648) 87 U/L       0-220                 

    

 

             Lab Interpretation (test code = Normal                             

    



             60433-4)                                            



[facility]





             Test Item    Value        Reference Range Interpretation Comments

 

             WBC (test code =              See_Comment  H             [Automated



             8690-2)                                             message] The sy

stem



                                                                 which generated



                                                                 this result



                                                                 transmitted



                                                                 reference range

:



                                                                 4.20 - 10.70



                                                                 10*3/?L. The



                                                                 reference range

 was



                                                                 not used to



                                                                 interpret this



                                                                 result as



                                                                 normal/abnormal

.

 

             RBC (test code =              See_Comment                [Automated



             789-8)                                              message] The sy

stem



                                                                 which generated



                                                                 this result



                                                                 transmitted



                                                                 reference range

:



                                                                 4.26 - 5.52



                                                                 10*6/?L. The



                                                                 reference range

 was



                                                                 not used to



                                                                 interpret this



                                                                 result as



                                                                 normal/abnormal

.

 

             HGB (test code = 15.9 g/dL    12.2-16.4                 



             718-7)                                              

 

             HCT (test code = 47.8 %       38.4-49.3                 



             4544-3)                                             

 

             MCV (test code = 88.5 fL      81.7-95.6                 



             787-2)                                              

 

             MCH (test code = 29.4 pg      26.1-32.7                 



             785-6)                                              

 

             MCHC (test code = 33.3 g/dL    31.2-35.0                 



             786-4)                                              

 

             RDW-SD (test code = 44.8 fL      38.5-51.6                 



             76880-6)                                            

 

             RDW-CV (test code = 13.7 %       12.1-15.4                 



             788-0)                                              

 

             PLT (test code =              See_Comment                [Automated



             777-3)                                              message] The sy

stem



                                                                 which generated



                                                                 this result



                                                                 transmitted



                                                                 reference range

:



                                                                 150 - 328 10*3/

?L.



                                                                 The reference r

jaime



                                                                 was not used to



                                                                 interpret this



                                                                 result as



                                                                 normal/abnormal

.

 

             MPV (test code = 10.8 fL      9.8-13.0                  



             96628-5)                                            

 

             NRBC/100 WBC (test              See_Comment                [Automat

ed



             code = [id number])                                        message] 

The system



                                                                 which generated



                                                                 this result



                                                                 transmitted



                                                                 reference range

:



                                                                 0.0 - 10.0 /100



                                                                 WBCs. The refer

ence



                                                                 range was not u

sed



                                                                 to interpret th

is



                                                                 result as



                                                                 normal/abnormal

.

 

             NRBC x10^3 (test code <0.01        See_Comment                [Auto

mated



             = [id number])                                        message] The s

ystem



                                                                 which generated



                                                                 this result



                                                                 transmitted



                                                                 reference range

:



                                                                 10*3/?L. The



                                                                 reference range

 was



                                                                 not used to



                                                                 interpret this



                                                                 result as



                                                                 normal/abnormal

.

 

             GRAN MAT (NEUT) % 78.0 %                                 



             (test code = 770-8)                                        

 

             IMM GRAN % (test code 0.60 %                                 



             = [id number])                                        

 

             LYMPH % (test code = 12.5 %                                 



             736-9)                                              

 

             MONO % (test code = 6.8 %                                  



             5905-5)                                             

 

             EOS % (test code = 1.4 %                                  



             713-8)                                              

 

             BASO % (test code = 0.7 %                                  



             706-2)                                              

 

             GRAN MAT x10^3(ANC) 8.74 10*3/uL 1.99-6.95    H            



             (test code =                                        



             9659978056)                                         

 

             IMM GRAN x10^3 (test 0.07 10*3/uL 0.00-0.06    H            



             code = [id number])                                        

 

             LYMPH x10^3 (test code 1.40 10*3/uL 1.09-3.23                 



             = 731-0)                                            

 

             MONO x10^3 (test code 0.76 10*3/uL 0.36-1.02                 



             = 742-7)                                            

 

             EOS x10^3 (test code = 0.16 10*3/uL 0.06-0.53                 



             711-2)                                              

 

             BASO x10^3 (test code 0.08 10*3/uL 0.01-0.09                 



             = 704-7)                                            

 

             Lab Interpretation Abnormal                               



             (test code = 62691-1)                                        



[facility]

## 2022-01-02 VITALS — OXYGEN SATURATION: 94 % | SYSTOLIC BLOOD PRESSURE: 156 MMHG | TEMPERATURE: 98.7 F | DIASTOLIC BLOOD PRESSURE: 71 MMHG

## 2022-01-02 NOTE — RAD REPORT
EXAM DESCRIPTION:  RAD - Chest Pa And Lat (2 Views) - 1/2/2022 12:23 am

 

CLINICAL HISTORY:  COUGH

 

COMPARISON:  Chest Single View dated 1/21/2020; Chest Single View dated 1/11/2017; Chest Single View 
dated 1/9/2017; Chest Single View dated 1/8/2017

 

FINDINGS:  Lines: None.

Lungs: No evidence of edema or pneumonia.

Pleural: No significant pleural effusions or pneumothorax.

Cardiac: The heart size is within normal limits.

Bones: No acute fractures.

Other:

 

IMPRESSION:  No acute cardiopulmonary disease.

## 2022-01-02 NOTE — ER
Nurse's Notes                                                                                     

 [facility]                                                                 

Name: Edgar Patel                                                                         

Age: 41 yrs                                                                                       

Sex: Male                                                                                         

: 1980                                                                                   

MRN: [id number]                                                                                   

Arrival Date: 2022                                                                          

Time: 18:19                                                                                       

Account#: [id number]                                                                            

Bed DIS1                                                                                          

Private MD:                                                                                       

Diagnosis: Unspecified bacterial pneumonia                                                        

                                                                                                  

Presentation:                                                                                     

                                                                                             

20:20 Chief complaint: Patient states: has had fever, cough, chest pain x 3 days. Coronavirus bb  

      screen: cough unrelated to allergies, fever, Client presents with at least one sign or      

      symptom that may indicate coronavirus-19. Standard/surgical mask placed on the client.      

      Ebola Screen: No symptoms or risks identified at this time. Initial Sepsis Screen: Does     

      the patient meet any 2 criteria? No. Patient's initial sepsis screen is negative.           

      Initial Sepsis Screen: Does the patient have a suspected source of infection? No.           

      Patient's initial sepsis screen is negative. Risk Assessment: Do you want to hurt           

      yourself or someone else? Patient reports no desire to harm self or others. Onset of        

      symptoms was 2021.                                                             

20:20 Method Of Arrival: Ambulatory                                                           bb  

20:20 Acuity: MADI 3                                                                           bb  

                                                                                                  

Triage Assessment:                                                                                

20:22 General: Appears uncomfortable, ill, Behavior is calm, cooperative. Pain: Complains of  bb  

      pain in all over. Neuro: Level of Consciousness is awake, alert, obeys commands,            

      Oriented to person, place, time, situation. Cardiovascular: Capillary refill < 3            

      seconds Patient's skin is warm and dry. Respiratory: Respiratory effort is labored,         

      Respiratory pattern is tachypnea. GI: Abdomen is obese. Derm: Skin is pink, warm \T\ dry.   

      Musculoskeletal: Circulation, motion, and sensation intact.                                 

                                                                                                  

Historical:                                                                                       

- Allergies:                                                                                      

20:22 No Known Allergies;                                                                     bb  

- Home Meds:                                                                                      

20:22 None [Active];                                                                          bb  

- PMHx:                                                                                           

20:22 Asthma; Bipolar disorder; Diabetes; Hypertension; Sleep Apnea;                          bb  

                                                                                                  

- Immunization history:: Client reports receiving the 2nd dose of the Covid vaccine,              

  does not know which one.                                                                        

- Social history:: Smoking status: Patient reports the use of cigarette tobacco                   

  products, smokes one-half pack cigarettes per day.                                              

                                                                                                  

                                                                                                  

Screenin:27 Abuse screen: Denies threats or abuse. Nutritional screening: No deficits noted.        bb  

      Tuberculosis screening: No symptoms or risk factors identified. Fall Risk None              

      identified.                                                                                 

                                                                                                  

Assessment:                                                                                       

23:27 Reassessment: No changes from previously documented assessment. pt is A\T\O x 4,          bb

      tachypneic, awaiting Xray.                                                                  

                                                                                             

00:27 Reassessment: pt is A\T\O x 4, resp tachypneic, pt ambulated O2 sats 94% ED provider      bb

      notified pt okay for discharge. Pt verbalized understanding of and agrees to plan of        

      care discharge instructions given pt ambulated with steady gait to exit.                    

                                                                                                  

Vital Signs:                                                                                      

                                                                                             

20:20  / 96; Pulse 110; Resp 26 S; Temp 98.6(O); Pulse Ox 98% on R/A; Weight 129.27 kg  bb  

      (R); Height 5 ft. 6 in. (167.64 cm) (R); Pain 10/10;                                        

                                                                                             

00:25  / 71; Pulse 112; Resp 16; Temp 98.7(O); Pulse Ox 94% ;                           lt3 

                                                                                             

20:20 Body Mass Index 46.00 (129.27 kg, 167.64 cm)                                            bb  

                                                                                                  

ED Course:                                                                                        

                                                                                             

18:19 Patient arrived in ED.                                                                  mr  

20:22 Triage completed.                                                                       bb  

20:22 Arm band placed on Patient placed in waiting room, Patient notified of wait time.       bb  

      covid/flu swab sent.                                                                        

20:32 COVID swab sent to lab. Flu and/or RSV swab sent to lab.                                lt3 

20:33 COVID-19/FLU A+B (Document "Date of Onset" if Symptomatic) Sent.                        lt3 

23:08 [facility]                                                               jr8 

23:08 Luis Angel Stallworth MD is Attending Physician.                                             jr8 

23:27 Patient has correct armband on for positive identification.                             bb  

23:27 Patient maintains SpO2 saturation greater than 95% on room air.                         bb  

                                                                                             

00:16 Joanne Capps, RN is Primary Nurse.                                                   bb  

00:23 XRAY Chest Pa And Lat (2 Views) In Process Unspecified.                                 EDMS

00:27 No provider procedures requiring assistance completed. Patient did not have IV access   bb  

      during this emergency room visit.                                                           

                                                                                                  

Administered Medications:                                                                         

                                                                                             

23:27 Drug: Tussionex Pennkinetic ER (chlorpheniramine-hydrocodone) Suspension 5 ml Route: PO;bb  

                                                                                             

00:28 Follow up: Response: No adverse reaction                                                bb  

00:16 Drug: LevaQUIN (levofloxacin) 750 mg Route: PO;                                         bb  

00:28 Follow up: Response: Medication administered at discharge.                              elvin  

                                                                                                  

                                                                                                  

Outcome:                                                                                          

00:01 Discharge ordered by MD. kapoor 

00:27 Discharged to home ambulatory.                                                          elvin  

00: Condition: stable                                                                           

00:27 Discharge instructions given to patient, Instructed on discharge instructions, follow       

      up and referral plans. medication usage, Demonstrated understanding of instructions,        

      follow-up care, medications, Prescriptions given X 2.                                       

00:28 Patient left the ED.                                                                    bb  

                                                                                                  

Signatures:                                                                                       

Dispatcher [employer]                           EDMS                                                 

Rachel Novak Brenda, RN                     RN   Brian Arndt PA PA jr8                                                  

Karine Mccann                                   3                                                  

                                                                                                  

**************************************************************************************************

## 2022-01-16 ENCOUNTER — HOSPITAL ENCOUNTER (INPATIENT)
Dept: HOSPITAL 97 - ER | Age: 42
LOS: 2 days | Discharge: HOME | DRG: 353 | End: 2022-01-18
Attending: HOSPITALIST | Admitting: HOSPITALIST
Payer: SELF-PAY

## 2022-01-16 VITALS — BODY MASS INDEX: 41.9 KG/M2

## 2022-01-16 DIAGNOSIS — T40.2X5A: ICD-10-CM

## 2022-01-16 DIAGNOSIS — G92.8: ICD-10-CM

## 2022-01-16 DIAGNOSIS — E66.9: ICD-10-CM
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
## 2022-01-16 DIAGNOSIS — G47.33: ICD-10-CM

## 2022-01-16 DIAGNOSIS — K42.0: Primary | ICD-10-CM

## 2022-01-16 DIAGNOSIS — Z20.822: ICD-10-CM

## 2022-01-16 LAB
ALBUMIN SERPL BCP-MCNC: 3.6 G/DL (ref 3.4–5)
ALP SERPL-CCNC: 60 U/L (ref 45–117)
ALT SERPL W P-5'-P-CCNC: 25 U/L (ref 12–78)
AST SERPL W P-5'-P-CCNC: 15 U/L (ref 15–37)
BUN BLD-MCNC: 18 MG/DL (ref 7–18)
GLUCOSE SERPLBLD-MCNC: 124 MG/DL (ref 74–106)
HCT VFR BLD CALC: 45.7 % (ref 39.6–49)
LIPASE SERPL-CCNC: 64 U/L (ref 73–393)
LYMPHOCYTES # SPEC AUTO: 1.1 K/UL (ref 0.7–4.9)
PMV BLD: 8.6 FL (ref 7.6–11.3)
POTASSIUM SERPL-SCNC: 3.6 MMOL/L (ref 3.5–5.1)
RBC # BLD: 5.29 M/UL (ref 4.33–5.43)

## 2022-01-16 PROCEDURE — 74177 CT ABD & PELVIS W/CONTRAST: CPT

## 2022-01-16 PROCEDURE — 99284 EMERGENCY DEPT VISIT MOD MDM: CPT

## 2022-01-16 PROCEDURE — 94010 BREATHING CAPACITY TEST: CPT

## 2022-01-16 PROCEDURE — 80048 BASIC METABOLIC PNL TOTAL CA: CPT

## 2022-01-16 PROCEDURE — 88302 TISSUE EXAM BY PATHOLOGIST: CPT

## 2022-01-16 PROCEDURE — 85025 COMPLETE CBC W/AUTO DIFF WBC: CPT

## 2022-01-16 PROCEDURE — 84100 ASSAY OF PHOSPHORUS: CPT

## 2022-01-16 PROCEDURE — 82947 ASSAY GLUCOSE BLOOD QUANT: CPT

## 2022-01-16 PROCEDURE — 83690 ASSAY OF LIPASE: CPT

## 2022-01-16 PROCEDURE — 96375 TX/PRO/DX INJ NEW DRUG ADDON: CPT

## 2022-01-16 PROCEDURE — 80076 HEPATIC FUNCTION PANEL: CPT

## 2022-01-16 PROCEDURE — 0WQF0ZZ REPAIR ABDOMINAL WALL, OPEN APPROACH: ICD-10-PCS

## 2022-01-16 PROCEDURE — 80053 COMPREHEN METABOLIC PANEL: CPT

## 2022-01-16 PROCEDURE — 93005 ELECTROCARDIOGRAM TRACING: CPT

## 2022-01-16 PROCEDURE — 82565 ASSAY OF CREATININE: CPT

## 2022-01-16 PROCEDURE — 83735 ASSAY OF MAGNESIUM: CPT

## 2022-01-16 PROCEDURE — 96374 THER/PROPH/DIAG INJ IV PUSH: CPT

## 2022-01-16 PROCEDURE — 36415 COLL VENOUS BLD VENIPUNCTURE: CPT

## 2022-01-16 RX ADMIN — SODIUM CHLORIDE SCH: 0.9 INJECTION, SOLUTION INTRAVENOUS at 12:10

## 2022-01-16 RX ADMIN — SODIUM CHLORIDE SCH MLS: 9 INJECTION, SOLUTION INTRAVENOUS at 22:25

## 2022-01-16 RX ADMIN — SODIUM CHLORIDE SCH: 9 INJECTION, SOLUTION INTRAVENOUS at 12:00

## 2022-01-16 RX ADMIN — HUMAN INSULIN SCH: 100 INJECTION, SOLUTION SUBCUTANEOUS at 12:10

## 2022-01-16 RX ADMIN — SODIUM CHLORIDE SCH: 0.9 INJECTION, SOLUTION INTRAVENOUS at 22:10

## 2022-01-16 RX ADMIN — Medication SCH ML: at 22:25

## 2022-01-16 RX ADMIN — HUMAN INSULIN SCH: 100 INJECTION, SOLUTION SUBCUTANEOUS at 18:00

## 2022-01-16 NOTE — EDPHYS
Physician Documentation                                                                           

 [facility]                                                                 

Name: Edgar Patel                                                                         

Age: 41 yrs                                                                                       

Sex: Male                                                                                         

: 1980                                                                                   

MRN: [id number]                                                                                   

Arrival Date: 2022                                                                          

Time: 06:50                                                                                       

Account#: [id number]                                                                            

Bed 2                                                                                             

Private MD:                                                                                       

ED Physician Parveen Murry                                                                       

HPI:                                                                                              

                                                                                             

07:46 This 41 yrs old Male presents to ER via Wheelchair with complaints of Hernia bulging    kdr 

      and pain.                                                                                   

07:46 The patient presents with abdominal pain in the periumbilical area. abdominal           kdr 

      distention that is diffuse. Onset: The symptoms/episode began/occurred gradually, 4         

      day(s) ago. The symptoms do not radiate. Associated signs and symptoms: Pertinent           

      positives: constipation, nausea, Pertinent negatives: diarrhea, dysuria, fever,             

      headache, hematuria, palpitations, shortness of breath, vomiting blood. The symptoms        

      are described as achy, constant, crampy. Modifying factors: The symptoms are alleviated     

      by nothing, the symptoms are aggravated by movement, touching the area, walking.            

      Severity of pain: At its worst the pain was moderate severe just prior to arrival, in       

      the emergency department the pain is unchanged. Patient states he has had abdominal         

      pain like this off and on for the last 2 months. For the last 4 days he has not had a       

      bowel movement and the pain has been more intense. He indicated that he is been able to     

      push the hernia back in intermittently but not for the last 2 days. The patient has not     

      recently seen a physician.                                                                  

                                                                                                  

Historical:                                                                                       

- Allergies:                                                                                      

07:26 No Known Allergies;                                                                     iw  

- PMHx:                                                                                           

07:26 Asthma; Bipolar disorder; Diabetes; Hypertension; Sleep Apnea;                          iw  

                                                                                                  

- Immunization history:: Client reports receiving the 2nd dose of the Covid vaccine,              

  Pneumococcal vaccine is not up to date, Flu vaccine is not up to date.                          

- Social history:: Smoking status: Patient denies any tobacco usage or history of.                

                                                                                                  

                                                                                                  

ROS:                                                                                              

07:46 Constitutional: Negative for fever, chills, and weight loss, Eyes: Negative for injury, kdr 

      pain, redness, and discharge, ENT: Negative for injury, pain, and discharge, Neck:          

      Negative for injury, pain, and swelling, Cardiovascular: Negative for chest pain,           

      palpitations, and edema, Respiratory: Negative for shortness of breath, cough,              

      wheezing, and pleuritic chest pain, Back: Negative for injury and pain, : Negative        

      for injury, bleeding, discharge, and swelling, MS/Extremity: Negative for injury and        

      deformity, Skin: Negative for injury, rash, and discoloration, Neuro: Negative for          

      headache, weakness, numbness, tingling, and seizure activity. Psych: Negative for           

      depression, anxiety, suicide ideation, homicidal ideation, and hallucinations,              

      Allergy/Immunology: Negative for hives, rash, and allergies, Endocrine: Negative for        

      neck swelling, polydipsia, polyuria, polyphagia, and marked weight changes,                 

      Hematologic/Lymphatic: Negative for swollen nodes, abnormal bleeding, and unusual           

      bruising.                                                                                   

07:46 Abdomen/GI: Positive for abdominal pain, nausea, constipation, Negative for bowel           

      incontinence, flatulence.                                                                   

                                                                                                  

Exam:                                                                                             

07:46 Constitutional:  This is a well developed, well nourished patient who is awake, alert,  kdr 

      and in mild to moderate distress. Head/Face:  Normocephalic, atraumatic. Eyes:  Pupils      

      equal round and reactive to light, extra-ocular motions intact.  Lids and lashes            

      normal.  Conjunctiva and sclera are non-icteric and not injected.  Cornea within normal     

      limits.  Periorbital areas with no swelling, redness, or edema. Neck:  Trachea midline,     

      no thyromegaly or masses palpated, and no cervical lymphadenopathy.  Supple, full range     

      of motion without nuchal rigidity, or vertebral point tenderness.  No Meningismus.          

      Chest/axilla:  Normal chest wall appearance and motion.  Nontender with no deformity.       

      No lesions are appreciated. Cardiovascular:  Regular rate and rhythm with a normal S1       

      and S2.  No gallops, murmurs, or rubs.  Normal PMI, no JVD.  No pulse deficits.             

      Respiratory:  Lungs have equal breath sounds bilaterally, clear to auscultation and         

      percussion.  No rales, rhonchi or wheezes noted.  No increased work of breathing, no        

      retractions or nasal flaring. Back:  No spinal tenderness.  No costovertebral               

      tenderness.  Full range of motion. Skin:  Warm, dry with normal turgor.  Normal color       

      with no rashes, no lesions, and no evidence of cellulitis. MS/ Extremity:  Pulses           

      equal, no cyanosis.  Neurovascular intact.  Full, normal range of motion. Neuro:  Awake     

      and alert, GCS 15, oriented to person, place, time, and situation.  Cranial nerves          

      II-XII grossly intact.  Motor strength 5/5 in all extremities.  Sensory grossly intact.     

       Cerebellar exam normal.  Normal gait. Psych:  Awake, alert, with orientation to            

      person, place and time.  Behavior, mood, and affect are within normal limits.               

07:46 Abdomen/GI: Inspection: distension, that is mild, obese Bowel sounds: diminished, in        

      all quadrants, Palpation: soft, moderate abdominal tenderness, in the umbilical area.       

                                                                                                  

Vital Signs:                                                                                      

07:24  / 89; Pulse 95; Resp 14; Temp 97.2; Pulse Ox 98% on R/A; Weight 117.93 kg;       iw  

      Height 5 ft. 6 in. (167.64 cm);                                                             

07:51  / 82; Pulse 83; Resp 18; Pulse Ox 100% ; Pain 8/10;                              jh6 

09:15  / 93; Pulse 96; Resp 17 S; Pulse Ox 96% on R/A;                                  jd3 

10:30  / 64; Pulse 82; Resp 18 S; Pulse Ox 98% on R/A;                                  jd3 

07:24 Body Mass Index 41.96 (117.93 kg, 167.64 cm)                                            iw  

                                                                                                  

MDM:                                                                                              

07:46 Data reviewed: vital signs, nurses notes, lab test result(s), radiologic studies.       kdr 

      Counseling: I had a detailed discussion with the patient and/or guardian regarding: the     

      historical points, exam findings, and any diagnostic results supporting the                 

      discharge/admit diagnosis, lab results, radiology results.                                  

09:17 Patient medically screened.                                                             kdr 

                                                                                                  

                                                                                             

07:44 Order name: Basic Metabolic Panel; Complete Time: 08:51                                 kdr 

                                                                                             

07:44 Order name: CBC with Diff; Complete Time: 08:51                                         kdr 

                                                                                             

07:44 Order name: Hepatic Function; Complete Time: 08:51                                      kdr 

                                                                                             

07:44 Order name: Lipase; Complete Time: 08:51                                                kdr 

                                                                                             

07:46 Order name: CT Abd/Pelvis - IV Contrast Only; Complete Time: 09:05                      kdr 

                                                                                             

09:25 Order name: SARS-COV-2 RT PCR (Document "Date of Onset" if Symptomatic)                 eb  

                                                                                             

07:44 Order name: IV; Complete Time: 07:58                                                    jd3 

                                                                                             

07:44 Order name: Labs collected and sent; Complete Time: 07:58                               kdr 

                                                                                                  

Administered Medications:                                                                         

08:00 Drug: Zofran (Ondansetron) 4 mg Route: IVP; Site: right antecubital;                    jh6 

09:00 Follow up: Response: No adverse reaction                                                jd3 

08:01 Drug: morphine 4 mg Route: IVP; Site: right antecubital;                                jh6 

09:00 Follow up: Response: No adverse reaction; RASS: Alert and Calm (0)                      jd3 

09:27 Drug: Zosyn (piperacillin-tazobactam) 3.375 grams Route: IVPB; Infused Over: 60 mins;   jd3 

      Site: right antecubital;                                                                    

                                                                                                  

                                                                                                  

Disposition Summary:                                                                              

22 09:17                                                                                    

Hospitalization Ordered                                                                           

      Hospitalization Status: Inpatient Admission                                             kdr 

      Provider: Jesse Pelaez 

      Location: Telemetry/MedSurg (Inpatient)                                                 kdr 

      Condition: Fair                                                                         kdr 

      Problem: new                                                                            kdr 

      Symptoms: have improved                                                                 kdr 

      Bed/Room Type: Standard                                                                 kdr 

      Room Assignment:                                                                        kdr 

      Diagnosis                                                                                   

        - Other abdominal pain - Periumbilical                                                kdr 

        - Mechanical small bowel obstruction secondary to incarcerated bowel in the umbilical kdr 

      hernia                                                                                      

      Forms:                                                                                      

        - Medication Reconciliation Form                                                      kdr 

        - SBAR form                                                                           kdr 

Signatures:                                                                                       

Dispatcher [employer]                           EDParveen Baig MD MD   kdr                                                  

Ethel Grimm RN RN iw Davies, Jonathon, RN RN   jd3                                                  

Miranda Kim RN                   RN   jh6                                                  

                                                                                                  

**************************************************************************************************

## 2022-01-16 NOTE — P.HP
Certification for Inpatient


Patient admitted to: Inpatient


With expected LOS: >2 Midnights


Practitioner: I am a practitioner with admitting privileges, knowledge of 

patient current condition, hospital course, and medical plan of care.


Services: Services provided to patient in accordance with Admission requirements

found in Title 42 Section 412.3 of the Code of Federal Regulations





Patient History


Date of Service: 01/16/22


Reason for admission: Abdominal pain


History of Present Illness: 


41-year-old gentleman with a history of obesity, obstructive sleep apnea, 

substance abuse, umbilical hernia presented to the ED with a complaint of 

abdominal pain of sudden onset.  Patient was somnolent after receiving IV 

morphine for pain and could not provide any history.  CT abdomen and pelvis done

in the ED demonstrated incarcerated umbilical hernia with small bowel 

obstruction.  General surgery Dr. Mcnamara contacted who is planning on emergent

surgery.  Patient admitted for further management.





Allergies





No Known Allergies Allergy (Verified 06/08/19 04:10)


   





Home Medications: 








Pantoprazole [Protonix Tab*] 40 mg PO DAILYAC #30 tab 06/10/19 








- Past Medical/Surgical History


Diabetic: No


-: obesity


-: illegal drug abuse


-: History of depression


-: Sleep apnea noncompliant





- Social History


Alcohol use: No


Caffeine use: Yes


Place of Residence: Home





Review of Systems


is unable to be obtained





Physical Examination





- Physical Exam


General: In no apparent distress, Other (Somnolent)


HEENT: Atraumatic, PERRLA, Mucous membr. moist/pink, EOMI


Neck: Supple, JVD not distended


Respiratory: Clear to auscultation bilaterally, Normal air movement


Cardiovascular: No edema, Regular rate/rhythm, Normal S1 S2, No murmurs


Capillary refill: <2 Seconds


Gastrointestinal: Normal bowel sounds, Other (Umbilical hernia mid abdomen), 

Distended (Mildly distended), Tenderness (Mid abdomen)


Musculoskeletal: No swelling, No tenderness


Integumentary: No rashes, No erythema, No cyanosis


Neurological: Normal strength at 5/5 x4 extr, Other (Somnolent)


Lymphatics: No axilla or inguinal lymphadenopathy





- Studies


Laboratory Data (last 24 hrs)





01/16/22 07:58: WBC 11.50 H, Hgb 14.9, Hct 45.7, Plt Count 317


01/16/22 07:58: Sodium 141, Potassium 3.6, BUN 18, Creatinine 0.93, Glucose 124 

H, Total Bilirubin 2.0 H, AST 15, ALT 25, Alkaline Phosphatase 60, Lipase 64 L








Assessment and Plan





- Problems (Diagnosis)


(1) Drug-induced encephalopathy


Current Visit: Yes   Status: Acute   





(2) Small bowel obstruction


Current Visit: Yes   Status: Acute   





(3) Incarcerated hernia


Current Visit: Yes   Status: Acute   





- Plan


Admit to the medical floor.


Patient planned for emergent surgery.


Dr. Mcnamara contacted.


IV morphine as needed for pain


Empiric IV Zosyn.


IV hydration with normal saline


Would recommend CPAP during sleep.








- Advance Directives


Does patient have a Living Will: No


Does patient have a Durable POA for Healthcare: No

## 2022-01-16 NOTE — OP
Date of Procedure:  01/16/2022



Surgeon:  Jerrell Mcnamara MD



Preoperative Diagnoses:  Small bowel obstruction, incarcerated umbilical hernia, abdominal pain.



Postoperative Diagnoses:  Small bowel obstruction, incarcerated umbilical hernia, abdominal pain, int
raabdominal adhesions.



Procedures:  Exploratory laparotomy, lysis of adhesions, repair of incarcerated umbilical hernia.



Finding:  Incarcerated bowel present after full visualization seemed to be viable.



Anesthesia:  General plus local.



Complications:  None.



Specimen:  Hernia sac.



Estimated Blood Loss:  Less than 20 cc.



Indication For Procedure:  This is the case of a male, who comes to us today with intractable abdomin
al pain with incarcerated umbilical hernia and also small bowel obstruction.  The benefits and risks 
of a repair of an incarcerated umbilical hernia with possible laparotomy, possible bowel resection we
re fully explained, which include, but not limited to infection, bleeding, damage to adjacent structu
res, anesthesia complication, recurrence, MI, and even death.  He also understands this may not relie
ve the symptoms.  He might need more than one surgical intervention.  He understood, signed a consent
.



Description Of Procedure:  The patient was emergently brought to the operating room, placed in supine
 position.  Anesthesia was done without complication.  A time-out was called.  Abdomen was prepped an
d draped in a sterile fashion.  Midline incision was made extending from the umbilical area and the v
entral region.  Incision was carried down to be able to visualize this hernia sac.  We opened the her
farhan sac, noted to have incarcerated bowel, some adhesions holding his bowel, so we proceeded to caref
ully with the help of the LigaSure do the lysis of adhesions and then we were able to run the bowel w
ith still the area of the obstruction, the transition point.  Seemed to be viable.  So, we retracted 
carefully the bowel back into the abdominal cavity, proceeded to remove the hernia sac, clean the fas
cial edges.  After several minutes, we went there once again, ran the bowel once again.  Noticed the 
same area and is still viable.  So, I believe in this case, we will leave the bowel intact and see ho
w he does clinically.  There is always a possibility to come back if the bowel does not function, but
 right now it looks viable.  So, area was irrigated.  The area of the lysis of adhesions was checked.
  At that moment, we proceeded to approximate the fascial edges where #1 Prolene figure-of-eight fash
ioned multiple times.  Area was irrigated and then after that I proceeded to close the subcutaneous t
issue with 3-0 chromic and skin with staples.  Sponge count, instrument counts were correct.  Patient
 tolerated the procedure well.  Patient was sent to recovery in stable condition.





REBECCA/SUNIL

DD:  01/16/2022 15:08:29Voice ID:  [id number]

DT:  01/16/2022 23:12:11Report ID:  [id number]

## 2022-01-16 NOTE — XMS REPORT
Continuity of Care Document

                           Created on:2022



Patient:AMEE EVANS

Sex:Male

:1980

External Reference #:[id number]





Demographics







                          Address                   [address]



                                                    [place] [address]

 

                          Home Phone                [phone]

 

                          Work Phone                [phone]

 

                          Mobile Phone              [phone]

 

                          Preferred Language        en

 

                          Marital Status            Unknown

 

                          Adventist Affiliation     Unknown

 

                          Race                      Unknown

 

                          Additional Race(s)        White

 

                          Ethnic Group              Unknown









Author







                          Organization              [facility]

t

 

                          Address                   [address]



                                                    [place] [address]

 

                          Phone                     [phone]









Support







                Name            Relationship    Address         Phone

 

                ALVARADO Nunes Child           [address]  [phone]

[phone]



                                                [place] [address] 

 

                Hoda        Sibling         [address]   [phone]



                                                [place] [address] 









Care Team Providers







                    Name                Role                Phone

 

                    Matt CONNIE            Primary Care Physician [phone]

 

                    Emory VEGAS,  A       Attending Clinician Unavailable

 

                    Singer OCONNOR           Attending Clinician [phone]

 

                    Jason DO           Attending Clinician [phone]

 

                    HANH                  Attending Clinician Unavailable

 

                    Jason OCONNOR           Admitting Clinician [phone]









Payers







           Payer Name Policy Type Policy Number Effective Date Expiration Date MAYITO wolfe

 

           [employer]            [id number]  2019            



           CARE                             00:00:00              

 

           [employer]            [id number]  2016            



                                            00:00:00              







Problems







       Condition Condition Condition Status Onset  Resolution Last   Treating Co

mments 

Source



       Name   Details Category        Date   Date   Treatment Clinician        



                                                 Date                 

 

       SBO (small SBO (small Disease Active 2021                             U

nivers



       bowel  bowel                0-24                               ity of



       obstructio obstructio               00:00:                             [place]

[place]



       n)     n)                   00                                 Medical



                                                                      Branch

 

       Ventral Ventral Disease Active                              Univers



       hernia hernia               7-12                               ity of



       with bowel with bowel               00:00:                             Te

[place]



       obstructio obstructio               00                                 Me

dical



       n      n                                                       Branch

 

       Morbid Morbid Disease Active                              Univers



       obesity obesity               7-12                               ity of



       with body with body               00:00:                             [place]

[place]



       mass index mass index               00                                 Me

dical



       of     of                                                      Branch



       40.0-49.9 40.0-49.9                                                  

 

       Obesity Obesity Disease Active                              Univers



                                   5-09                               ity of



                                   00:00:                             [place]



                                   00                                 [facility]

 

       Family Family Disease Active                              Univers



       history of history of                                              it

y of



       polyps in polyps in               00:00:                             [place]

[place]



       the colon the colon                                                [facility]

 

       Tobacco Tobacco Disease Active                              Univers



       use    use                                                 ity of



                                   00:00:                             [address]

 

       Blood in Blood in Disease Active                              Unive

rs



       stool  stool                                               ity of



                                   00:00:                             [place]



                                                                    [facility]

 

       Esophageal Esophageal Disease Active                              U

nivers



       reflux reflux                                              ity of



                                   00:00:                             [address]







Allergies, Adverse Reactions, Alerts







       Allergy Allergy Status Severity Reaction(s) Onset  Inactive Treating Comm

ents 

Source



       Name   Type                        Date   Date   Clinician        

 

       NO KNOWN Drug   Active                                           Univers



       ALLERGIE Class                                                   ity of



       S                                                              [facility]







Social History







           Social Habit Start Date Stop Date  Quantity   Comments   Source

 

           History of                       Cigarette Smoker            Universi

ty of



           tobacco use                                             [facility]

 

           History SDOH                                             University o

f



           Alcohol Std                                             [place] [facility]



           Drinks                                                 Branch

 

           History SDOH                                             University o

f



           Alcohol Binge                                             [place] [facility]

al



                                                                  Branch

 

           History SDOH                                             University o

f



           Alcohol Comment                                             [place] [facility]

ical



                                                                  Branch

 

           Exposure to                       Not sure              University of



           SARS-CoV-2                                             [facility]



           (event)                                                [facility]

 

           Alcohol intake 2021-10-24 2021-10-24 Lifetime              University

 of



                      00:00:00   00:00:00   non-drinker            [facility]



                                            (finding)             [facility]

 

           Tobacco Comment 2021 pt lethargic            Univers

ity of



                      00:00:00   00:00:00                         [facility]

 

           History SDOH 2019-10-10 2019-10-10 1                     University o

f



           Alcohol Frequency 00:00:00   00:00:00                         [facility]

 

           Sex Assigned At 1980                       Universit

y of



           Birth      00:00:00   00:00:00                         [facility]









                Smoking Status  Start Date      Stop Date       Source

 

                Current every day smoker                                 Univers

ity of [facility]







Medications







       Ordered Filled Start  Stop   Current Ordering Indication Dosage Frequency

 Signature

                    Comments            Components          Source



     Medication Medication Date Date Medication? Clinician                (SIG) 

          



     Name Name                                                   

 

     morpHINE      2021 No             4mg       4 mg, Slow           Un

faheem



     injection 4      0-25 10-25                          IV Push,           ity

 of



     mg        04:00: 02:50                          ONCE, 1           [place]



               00   :00                           dose, On           Medical



                                                  Sun            Branch



                                                  10/24/21           



                                                  at 2300,           



                                                  STAT           

 

     ondansetron      2021- No             4mg       4 mg, Slow          

 Univers



     (ZOFRAN      0-25 10-25                          IV Push,           ity of



     (PF))      03:45: 02:50                          ONCE, 1           [place]



     injection 4      00   :00                           dose, On           Medi

amaury



     mg                                           [facility]



                                                  10/24/21           



                                                  at 2245,           



                                                  Routine           

 

     iohexol      2021- No        [id number] 120mL      120 mL,           

Univers



     (OMNIPAQUE      0-25 10-25                          Intravenou           it

y of



     350       03:15: 03:05                          s, ONCE, 1           [place]



     BULK-100      00   :00                           dose, On           Medical



     mL)                                          [facility]



     injection                                         10/24/21           



     120 mL                                         at 2215,           



                                                  Routine           

 

     NaCl 0.9%      2021- No             500mL      at 999           Univ

ers



     (NS) bolus      0-25 10-25                          mL/hr, 500           it

y of



     infusion      02:45: 04:10                          mL, IV           [place]



     500 mL      00   :00                           Infusion,           Medical



                                                  ONCE, 1           Branch



                                                  dose, On           



                                                  Sun            



                                                  10/24/21           



                                                  at 2145,           



                                                  STAT           

 

     pantoprazol      2019      Yes       [id number] 40mg      Take 1          

 Univers



     e 40 mg EC      1-21                               tablet by           ity 

of



     tablet      00:00:                               mouth           [place]



               00                                 daily.           Medical



                                                                 Branch

 

     ibuprofen      2019      Yes       [id number] 800mg      Take 1           

Univers



     800 mg      1-21                               tablet by           ity of



     tablet      00:00:                               mouth           [place]



               00                                 every 6           Medical



                                                  (six)           Branch



                                                  hours as           



                                                  needed for           



                                                  Pain           



                                                  (scale           



                                                  4-6).           

 

     pantoprazol      2019      Yes       [id number] 40mg      Take 1          

 Univers



     e 40 mg EC      1-21                               tablet by           ity 

of



     tablet      00:00:                               mouth           [place]



               00                                 daily.           Medical



                                                                 Branch

 

     ibuprofen      2019      Yes       [id number] 800mg      Take 1           

Univers



     800 mg      1-21                               tablet by           ity of



     tablet      00:00:                               mouth           [place]



               00                                 every 6           Medical



                                                  (six)           Branch



                                                  hours as           



                                                  needed for           



                                                  Pain           



                                                  (scale           



                                                  4-6).           







Immunizations







           Ordered    Filled Immunization Date       Status     Comments   [facility]

e



           Immunization Name Name                                        

 

           Pneumococcal            2019-10-16 Completed             [place] [facility]

f



           Polysaccharide,            00:00:00                         [place] [facility]

ical



           PPSV23 (PNEUMOVAX)                                             Branch

 

           TDAP                  2019-10-16 Completed             [facility]



                                 00:00:00                         [facility]

 

           Pneumococcal            2019-10-16 Completed             [place] [facility]

f



           Polysaccharide,            00:00:00                         [place] [facility]

ical



           PPSV23 (PNEUMOVAX)                                             Branch

 

           TDAP                  2019-10-16 Completed             University of



                                 00:00:00                         [facility]

 

           Influenza Virus            2019-10-10 Completed             Universit

y of



           Vaccine Quad .5 mL            00:00:00                         [place] 

Medical



           IM 6+ MO                                               Branch

 

           Influenza Virus            2019-10-10 Completed             Universit

y of



           Vaccine Quad .5 mL            00:00:00                         [place] 

Medical



           IM 6+ MO                                               Branch







Vital Signs







             Vital Name   Observation Time Observation Value Comments     Source

 

             Systolic blood 2021-10-25 04:00:00 145 mm[Hg]                Univer

sity of



             [facility]

 

             Diastolic blood 2021-10-25 04:00:00 97 mm[Hg]                 [facility]

 

             Heart rate   2021-10-25 04:00:00 98 /min                   [facility]

 

             Respiratory rate 2021-10-25 04:00:00 28 /min                   [facility]

 

             Oxygen saturation in 2021-10-25 04:00:00 96 /min                   

[facility]



             Arterial blood by                                        [place] [facility]

amaury



             Pulse oximetry                                        Branch

 

             Body height  2021-10-25 02:43:00 167.6 cm                  [facility]

 

             Body weight  2021-10-25 02:43:00 120.203 kg                [facility]

 

             BMI          2021-10-25 02:43:00 42.77 kg/m2               [facility]

 

             Body temperature 2021-10-25 02:41:00 35.78 Rosa                 [facility]







Procedures







                Procedure       Date / Time Performed Performing Clinician Manny

e

 

                COVID-19 (ID NOW 2021-10-25 03:52:00 Lion Antonio [facility]



                RAPID TESTING)                                  [facility]

 

                CT ABDOMEN PELVIS W 2021-10-25 03:09:28 Lion Antonio Univers[facility]

ty of [place]



                CONTRAST                                        [facility]

 

                CBC WITH DIFF   2021-10-25 02:48:00 Lion Antonio o

f [facility]

 

                LIPASE          2021-10-25 02:48:00 Singer, [facility] o

[facility]

 

                COMP. METABOLIC PANEL 2021-10-25 02:48:00 [facility]

[facility]



                ([id number])                                         [facility]

 

                CONSENT/REFUSAL FOR 2021-10-25 02:34:10 Doctor Unassigned, No Un

[facility]



                DIAGNOSIS AND                   Name            Medical Branch



                TREATMENT                                       







Encounters







        Start   End     Encounter Admission Attending Care    Care    Encounter 

Source



        Date/Time Date/Time Type    Type    Clinicians Facility Department ID   

   

 

        2021         Emergency                 [facility]    [id number] 

Univers



        09:14:32                                                         ity of



                                                                        [facility]

 

        2021         Emergency                 [facility]    [id number] 

Univers



        07:21:37                                                         ity [facility]

 

        2021-10-26 2021-10-26 Transition         Yosef Cat  [id number]

[id number] Univers



        00:00:00 00:00:00 of Care         Myke Epstein   [id number]         

ity [facility]   [id number]         [place]

s



                                                        [phone]         Medi

amaury



                                                        [address]             Branch

 

        2021-10-24 2021-10-25 Hospital         Lion Antonio [facility]    [id number]

[id number] 

Univers



        21:36:00 00:04:00 Encounter         Darinel Gomez [id number]  

       ity of



                                                [place] [id number]         [place]

s



                                                [place]  [phone]         Medi

amaury



                                                        [address]             Branch

 

        2021 Outpatient LUCIUS SESAY [facility]    [id number]

[id number] Univers



        14:00:00 14:00:00                                         [id number]  [facility]

 

        2021 Outpatient LUCIUS SESAY [facility]    [id number]

[id number] Univers



        14:00:00 14:00:00                                                 [facility]







Results







           Test Description Test Time  Test Comments Results    Result Comments 

Source









                    COMP. METABOLIC PANEL ([id number]) 2021-10-25 03:40:15 









                      Test Item  Value      Reference Range Interpretation Comme

nts









             NA (test code = 1282883613) 139 mmol/L   135-145                   

 

             K (test code = 4670850392) 4.2 mmol/L   3.5-5.0                   

 

             CL (test code = 6279740485) 103 mmol/L                       

 

             CO2 TOTAL (test code = 0663505038) 25 mmol/L    23-31              

       

 

             AGAP (test code = 1559740323)              2-16                    

  

 

             BUN (test code = 2776196871) 22 mg/dL     7-23                     

 

 

             GLUCOSE (test code = 0540380642) 130 mg/dL           H       

     

 

             CREATININE (test code = 1.04 mg/dL   0.60-1.25                 



             [id number])                                         

 

             TOTAL BILI (test code = 0.7 mg/dL    0.1-1.1                   



             [id number])                                         

 

             CALCIUM (test code = 5509062973) 10.8 mg/dL   8.6-10.6     H       

     

 

             T PROTEIN (test code = 7700336049) 8.1 g/dL     6.3-8.2            

       

 

             ALBUMIN (test code = 6317286132) 4.6 g/dL     3.5-5.0              

     

 

             ALK PHOS (test code = 8197754587) 58 U/L                     

      

 

             ALTv (test code = 1742-6) 28 U/L       5-50                      

 

             AST(SGOT) (test code = 2347256453) 30 U/L       13-40              

       

 

             eGFR (test code = 8030072055)              mL/min/1.73m2           

   

 

             MARTHA (test code = MARTHA) Association of Glomerular                    

       



                          Filtration Rate (GFR) and Staging                     

      



                          of Kidney Disease*                           



                          +-----------------------+--------                     

      



                          -------------+-------------------                     

      



                          ------+| GFR (mL/min/1.73 m2) ?|                      

     



                          With Kidney Damage ?| ?Without                        

   



                          Kidney                                 



                          Damage+-----------------------+--                     

      



                          -------------------+-------------                     

      



                          ------------+| ?>90 ? ? ? ? ? ? ?                     

      



                          ? ?| ?Stage one ? ? ? ? ?| ?                          

 



                          Normal ? ? ? ? ? ? ?                           



                          ?+-----------------------+-------                     

      



                          --------------+------------------                     

      



                          -------+| ?60-89 ? ? ? ? ? ? ? ?|                     

      



                          ?Stage two ? ? ? ? ?| ? Decreased                     

      



                          GFR ? ? ? ?                            



                          +-----------------------+--------                     

      



                          -------------+-------------------                     

      



                          ------+| ?30-59 ? ? ? ? ? ? ? ?|                      

     



                          ?Stage three ? ? ? ?| ? Stage                         

  



                          three ? ? ? ? ?                           



                          +-----------------------+--------                     

      



                          -------------+-------------------                     

      



                          ------+| ?15-29 ? ? ? ? ? ? ? ?|                      

     



                          ?Stage four ? ? ? ? | ? Stage                         

  



                          four ? ? ? ? ?                           



                          ?+-----------------------+-------                     

      



                          --------------+------------------                     

      



                          -------+| ?<15 (or dialysis) ? ?|                     

      



                          ?Stage five ? ? ? ? | ? Stage                         

  



                          five ? ? ? ? ?                           



                          ?+-----------------------+-------                     

      



                          --------------+------------------                     

      



                          -------+ *Each stage assumes the                      

     



                          associated GFR level has been in                      

     



                          effect for at least three months.                     

      



                          ?Stages 1 to 5, with or without                       

    



                          kidney disease, indicate chronic                      

     



                          kidney disease. Notes:                           



                          Determination of stages one and                       

    



                          two (with eGFR >59mL/min/1.73 m2)                     

      



                          requires estimation of kidney                         

  



                          damage for at least three months                      

     



                          as defined by structural or                           



                          functional abnormalities of the                       

    



                          kidney, manifested by                           



                          either:Pathological abnormalities                     

      



                          or Markers of kidney damage                           



                          (including abnormalities in the                       

    



                          composition of the blood or urine                     

      



                          or abnormalities in imaging                           



                          tests).                                

 

             Lab Interpretation (test code = Abnormal                           

    



             57908-4)                                            



[facility]





             Test Item    Value        Reference Range Interpretation Comments

 

             LIPASE (test code = 5266820151) 87 U/L       0-220                 

    

 

             Lab Interpretation (test code = Normal                             

    



             18137-9)                                            



[facility]





             Test Item    Value        Reference Range Interpretation Comments

 

             WBC (test code =              See_Comment  H             [Automated



             5890-2)                                             message] The sy

stem



                                                                 which generated



                                                                 this result



                                                                 transmitted



                                                                 reference range

:



                                                                 4.20 - 10.70



                                                                 10*3/?L. The



                                                                 reference range

 was



                                                                 not used to



                                                                 interpret this



                                                                 result as



                                                                 normal/abnormal

.

 

             RBC (test code =              See_Comment                [Automated



             789-8)                                              message] The sy

stem



                                                                 which generated



                                                                 this result



                                                                 transmitted



                                                                 reference range

:



                                                                 4.26 - 5.52



                                                                 10*6/?L. The



                                                                 reference range

 was



                                                                 not used to



                                                                 interpret this



                                                                 result as



                                                                 normal/abnormal

.

 

             HGB (test code = 15.9 g/dL    12.2-16.4                 



             718-7)                                              

 

             HCT (test code = 47.8 %       38.4-49.3                 



             4544-3)                                             

 

             MCV (test code = 88.5 fL      81.7-95.6                 



             787-2)                                              

 

             MCH (test code = 29.4 pg      26.1-32.7                 



             785-6)                                              

 

             MCHC (test code = 33.3 g/dL    31.2-35.0                 



             786-4)                                              

 

             RDW-SD (test code = 44.8 fL      38.5-51.6                 



             75991-7)                                            

 

             RDW-CV (test code = 13.7 %       12.1-15.4                 



             788-0)                                              

 

             PLT (test code =              See_Comment                [Automated



             777-3)                                              message] The sy

stem



                                                                 which generated



                                                                 this result



                                                                 transmitted



                                                                 reference range

:



                                                                 150 - 328 10*3/

?L.



                                                                 The reference r

jaime



                                                                 was not used to



                                                                 interpret this



                                                                 result as



                                                                 normal/abnormal

.

 

             MPV (test code = 10.8 fL      9.8-13.0                  



             41456-2)                                            

 

             NRBC/100 WBC (test              See_Comment                [Automat

ed



             code = 7638343540)                                        message] 

The system



                                                                 which generated



                                                                 this result



                                                                 transmitted



                                                                 reference range

:



                                                                 0.0 - 10.0 /100



                                                                 WBCs. The refer

ence



                                                                 range was not u

sed



                                                                 to interpret th

is



                                                                 result as



                                                                 normal/abnormal

.

 

             NRBC x10^3 (test code <0.01        See_Comment                [Auto

mated



             = [id number])                                        message] The s

ystem



                                                                 which generated



                                                                 this result



                                                                 transmitted



                                                                 reference range

:



                                                                 10*3/?L. The



                                                                 reference range

 was



                                                                 not used to



                                                                 interpret this



                                                                 result as



                                                                 normal/abnormal

.

 

             GRAN MAT (NEUT) % 78.0 %                                 



             (test code = 770-8)                                        

 

             IMM GRAN % (test code 0.60 %                                 



             = [id number])                                        

 

             LYMPH % (test code = 12.5 %                                 



             736-9)                                              

 

             MONO % (test code = 6.8 %                                  



             5905-5)                                             

 

             EOS % (test code = 1.4 %                                  



             713-8)                                              

 

             BASO % (test code = 0.7 %                                  



             706-2)                                              

 

             GRAN MAT x10^3(ANC) 8.74 10*3/uL 1.99-6.95    H            



             (test code =                                        



             7022440063)                                         

 

             IMM GRAN x10^3 (test 0.07 10*3/uL 0.00-0.06    H            



             code = 4638272538)                                        

 

             LYMPH x10^3 (test code 1.40 10*3/uL 1.09-3.23                 



             = 731-0)                                            

 

             MONO x10^3 (test code 0.76 10*3/uL 0.36-1.02                 



             = 742-7)                                            

 

             EOS x10^3 (test code = 0.16 10*3/uL 0.06-0.53                 



             711-2)                                              

 

             BASO x10^3 (test code 0.08 10*3/uL 0.01-0.09                 



             = 704-7)                                            

 

             Lab Interpretation Abnormal                               



             (test code = 67026-7)                                        



[facility]

## 2022-01-16 NOTE — RAD REPORT
EXAM DESCRIPTION:  CTAbdomen   Pelvis W Contrast - 1/16/2022 8:31 am

 

CLINICAL HISTORY:  Abdominal pain.

Umbilical hernia;Abd pain

 

COMPARISON:  Thorax Wo Con dated 6/8/2019; Abdomen   Pelvis Wo Contrast dated 6/8/2019

 

TECHNIQUE:  Biphasic CT imaging of the abdomen and pelvis was performed with 100 ml non-ionic IV cont
rast.

 

All CT scans are performed using dose optimization technique as appropriate and may include automated
 exposure control or mA/KV adjustment according to patient size.

 

FINDINGS:  Multiple small pulmonary nodules are seen in the lung bases.

 

Mild diffuse fatty liver is present. Significant distention of the stomach with fluid. The spleen, pa
ncreas, adrenal glands and kidneys within normal limits.

 

Umbilical hernia is present containing single small bowel loop. There is fluid in the hernia sac like
ly indicating hernia incarceration. Moderately distended small bowel loops are present throughout the
 abdomen compatible with moderate mechanical bowel obstruction. Mild sigmoid diverticulosis. The appe
ndix is normal. No free air, significant free fluid or abscess. No evidence of significant lymphadeno
delmar.

 

Moderate lumbar and lumbosacral degenerative changes.

 

IMPRESSION:  Moderate mechanical bowel obstruction is present caused by moderate-sized incarcerated u
mbilical hernia containing small bowel loop.

## 2022-01-16 NOTE — P.BOP
Preoperative diagnosis: small bowel obtruction, incarcerated umbilical hernia


Postoperative diagnosis: same plus intrabdominal adhesions


Primary procedure: Exploratory laparotomy, lysis of adhesions, 


Secondary procedure: repair of incarcerated umbilical hernia


Estimated blood loss: <20cc


Specimen: sac


Findings: incarcerated but appeared viable small bowel


Anesthesia: General


Complications: None


Transferred to: Recovery Room


Condition: Good

## 2022-01-16 NOTE — CON
Date of Consultation:  01/16/2022



History Of Present Illness:  This is an emergent consult is the case of a 41-year-old patient complai
sharif of abdominal pain for at least 4 days of duration.  He states he has a hernia for the last 2 yea
rs in that region, but in the last few days been hurting and this morning, he could not take it anymo
re and he came to the ER.  It is associated with nausea and vomiting.  The patient found to have an i
ncarcerated, possible strangulated umbilical hernia with a small bowel obstruction, and the area of o
bstruction located at the area of the hernia.  The patient apparently has a history of sleep apnea, a
lthough he still does not take any medications; also substance abuse; obesity; depression.



Past Medical History:  Legal drug abuse, obesity, what he claimed may be manic-depressive sleep apnea
.



Social History:  History of legal drug abuse.  Caffeine, no alcohol.



Family History:  Noncontributory.



Review of Systems:

See H and P, nausea, vomiting, abdominal pain.  Review of systems 10 points, otherwise, unremarkable.




Physical Examination:

General:  Patient is awake, alert. 

HEENT:  Pupils are equal and reactive.  Anicteric. 

Neck:  Supple. 

Chest:  Clear. 

Abdomen:  Distended, tender.  There is a large bulging of the umbilical region consistent with an inc
arcerated possibly strangulated umbilical hernia with the skin discoloration.  Tender.  No guarding. 
 No rebound. 

Extremities:  Good capillary refill.



Laboratory Data:  Blood work shows WBC count of 11.5 with hemoglobin of 14.9, potassium 3.6, lipase 6
4.  CAT scan of the abdomen and pelvis interpreted by Dr. Lantigua as small bowel obstruction with inc
arcerated umbilical hernia containing small bowel loops.



Assessment:  This is a 41-year-old patient with small bowel obstruction and incarcerated possible str
angulated umbilical hernia.  Benefits, alternatives, and risks of emergent repair of a hernia, possib
le laparotomy, possible resection fully explained to the patient, which include, but not limited to, 
infection, bleeding, damage to adjacent structures as complications, MI, and even death.  He also und
erstands this may not relieve the symptoms.  He might need more than one surgical intervention.  He u
nderstood, signed a consent.  The patient was emergently brought to the operating room.





REBECCA/SUNIL

DD:  01/16/2022 10:58:43Voice ID:  [id number]

DT:  01/16/2022 12:33:43Report ID:  [id number]

## 2022-01-16 NOTE — ER
Nurse's Notes                                                                                     

 [facility]                                                                 

Name: Edgar Patel                                                                         

Age: 41 yrs                                                                                       

Sex: Male                                                                                         

: 1980                                                                                   

MRN: [id number]                                                                                   

Arrival Date: 2022                                                                          

Time: 06:50                                                                                       

Account#: [id number]                                                                            

Bed 2                                                                                             

Private MD:                                                                                       

Diagnosis: Other abdominal pain-Periumbilical;Mechanical small bowel obstruction secondary to     

  incarcerated bowel in the umbilical hernia                                                      

                                                                                                  

Presentation:                                                                                     

                                                                                             

07:24 Chief complaint: Patient states: has a bulging hernia and it's really tender and has    iw  

      gas build up and has acid reflux, was vomiting earlier. Coronavirus screen: Client          

      presents with at least one sign or symptom that may indicate coronavirus-19. Ebola          

      Screen: Patient negative for fever greater than or equal to 101.5 degrees Fahrenheit,       

      and additional compatible Ebola Virus Disease symptoms Patient denies exposure to           

      infectious person. Patient denies travel to an Ebola-affected area in the 21 days           

      before illness onset. No symptoms or risks identified at this time. Initial Sepsis          

      Screen: Does the patient meet any 2 criteria? No. Patient's initial sepsis screen is        

      negative. Does the patient have a suspected source of infection? No. Patient's initial      

      sepsis screen is negative. Risk Assessment: Do you want to hurt yourself or someone         

      else? Patient reports no desire to harm self or others. Onset of symptoms was 2021.                                                                                       

07:24 Method Of Arrival: Wheelchair                                                           iw  

07:25 Acuity: MADI 3                                                                           iw  

                                                                                                  

Historical:                                                                                       

- Allergies:                                                                                      

07:26 No Known Allergies;                                                                     iw  

- PMHx:                                                                                           

07:26 Asthma; Bipolar disorder; Diabetes; Hypertension; Sleep Apnea;                          iw  

                                                                                                  

- Immunization history:: Client reports receiving the 2nd dose of the Covid vaccine,              

  Pneumococcal vaccine is not up to date, Flu vaccine is not up to date.                          

- Social history:: Smoking status: Patient denies any tobacco usage or history of.                

                                                                                                  

                                                                                                  

Screenin:54 Abuse screen: Denies threats or abuse. Nutritional screening: No deficits noted.        6 

      Tuberculosis screening: No symptoms or risk factors identified. Fall Risk None              

      identified.                                                                                 

                                                                                                  

Assessment:                                                                                       

07:49 General: Appears uncomfortable, obese, unkempt, Behavior is calm, cooperative. Pain:    6 

      Complains of pain in umbilical area Pain currently is 8 out of 10 on a pain scale.          

      Quality of pain is described as shooting, gnawing, Pain began 2-3 days ago. Is              

      continuous, Aggravated by exercise, increased activity, Noted to be resistant to            

      movement. GI: Abdomen is round obese, Abdomen is tender to palpation in umbilical area      

      Reports upper abdominal pain.                                                               

09:15 Reassessment: Patient appears in no apparent distress at this time. No changes from     jd3 

      previously documented assessment. Patient and/or family updated on plan of care and         

      expected duration. Pain level reassessed. Patient is alert, oriented x 3, equal             

      unlabored respirations, skin warm/dry/pink.                                                 

10:30 Reassessment: Patient appears in no apparent distress at this time. No changes from     jd3 

      previously documented assessment. Patient and/or family updated on plan of care and         

      expected duration. Pain level reassessed. Patient is alert, oriented x 3, equal             

      unlabored respirations, skin warm/dry/pink. pt taken to surgery by Rita VEGAS.               

                                                                                                  

Vital Signs:                                                                                      

07:24  / 89; Pulse 95; Resp 14; Temp 97.2; Pulse Ox 98% on R/A; Weight 117.93 kg;       iw  

      Height 5 ft. 6 in. (167.64 cm);                                                             

07:51  / 82; Pulse 83; Resp 18; Pulse Ox 100% ; Pain 8/10;                              jh6 

09:15  / 93; Pulse 96; Resp 17 S; Pulse Ox 96% on R/A;                                  jd3 

10:30  / 64; Pulse 82; Resp 18 S; Pulse Ox 98% on R/A;                                  jd3 

07:24 Body Mass Index 41.96 (117.93 kg, 167.64 cm)                                            iw  

                                                                                                  

ED Course:                                                                                        

06:50 Patient arrived in ED.                                                                  wm  

07:22 Parveen Murry MD is Attending Physician.                                              kdr 

07:26 Triage completed.                                                                       iw  

07:45 Miranda Kim, RN is Primary Nurse.                                                 jh6 

07:54 Arm band placed on left wrist.                                                          jh6 

07:54 Bed in low position. Call light in reach. Side rails up X 1.                            jh6 

07:59 Initial lab(s) drawn, by me, sent to lab. Inserted saline lock: 20 gauge in right       mb4 

      antecubital area, using aseptic technique. Blood collected.                                 

08:31 CT Abd/Pelvis - IV Contrast Only In Process Unspecified.                                EDMS

09:14 Jesse Pelaez is Hospitalizing Provider.                                               kdr 

09:37 SARS-COV-2 RT PCR (Document "Date of Onset" if Symptomatic) Sent.                       [facility] 

09:38 COVID swab sent to lab.                                                                 [facility] 

10:30 No provider procedures requiring assistance completed. Patient admitted, IV remains in  j 

      place.                                                                                      

                                                                                                  

Administered Medications:                                                                         

08:00 Drug: Zofran (Ondansetron) 4 mg Route: IVP; Site: right antecubital;                    6 

09:00 Follow up: Response: No adverse reaction                                                j 

08:01 Drug: morphine 4 mg Route: IVP; Site: right antecubital;                                [facility] 

09:00 Follow up: Response: No adverse reaction; RASS: Alert and Calm (0)                      j 

09:27 Drug: Zosyn (piperacillin-tazobactam) 3.375 grams Route: IVPB; Infused Over: 60 mins;   [facility] 

      Site: right antecubital;                                                                    

                                                                                                  

                                                                                                  

Outcome:                                                                                          

09:17 Decision to Hospitalize by Provider.                                                    kdr 

11:00 Patient left the ED.                                                                      

                                                                                                  

Signatures:                                                                                       

Dispatcher [employer]                           EDMS                                                 

Parveen Murry MD MD   [facility]                                                  

Ethel Grimm RN                     RN                                                      

Violeta Mcnamara                              5                                                  

Billy Fallon RN                    RN   jd3                                                  

Elidia Vasquez                            mb4                                                  

Dorothea Jones Jennifer, RN                   RN   jh6                                                  

                                                                                                  

Corrections: (The following items were deleted from the chart)                                    

07:26 07:24 Onset of symptoms was 2021 [facility]  

                                                                                                  

**************************************************************************************************

## 2022-01-17 LAB
ALBUMIN SERPL BCP-MCNC: 2.8 G/DL (ref 3.4–5)
ALP SERPL-CCNC: 48 U/L (ref 45–117)
ALT SERPL W P-5'-P-CCNC: 24 U/L (ref 12–78)
AST SERPL W P-5'-P-CCNC: 16 U/L (ref 15–37)
BUN BLD-MCNC: 19 MG/DL (ref 7–18)
GLUCOSE SERPLBLD-MCNC: 108 MG/DL (ref 74–106)
HCT VFR BLD CALC: 38.4 % (ref 39.6–49)
LYMPHOCYTES # SPEC AUTO: 0.6 K/UL (ref 0.7–4.9)
MAGNESIUM SERPL-MCNC: 2.7 MG/DL (ref 1.8–2.4)
MORPHOLOGY BLD-IMP: (no result)
PMV BLD: 9 FL (ref 7.6–11.3)
POTASSIUM SERPL-SCNC: 4.7 MMOL/L (ref 3.5–5.1)
RBC # BLD: 4.38 M/UL (ref 4.33–5.43)

## 2022-01-17 RX ADMIN — Medication SCH ML: at 20:02

## 2022-01-17 RX ADMIN — HUMAN INSULIN SCH: 100 INJECTION, SOLUTION SUBCUTANEOUS at 17:16

## 2022-01-17 RX ADMIN — ENOXAPARIN SODIUM SCH MG: 40 INJECTION SUBCUTANEOUS at 10:11

## 2022-01-17 RX ADMIN — HUMAN INSULIN SCH: 100 INJECTION, SOLUTION SUBCUTANEOUS at 06:00

## 2022-01-17 RX ADMIN — HUMAN INSULIN SCH: 100 INJECTION, SOLUTION SUBCUTANEOUS at 12:00

## 2022-01-17 RX ADMIN — Medication SCH ML: at 09:00

## 2022-01-17 RX ADMIN — HUMAN INSULIN SCH: 100 INJECTION, SOLUTION SUBCUTANEOUS at 00:00

## 2022-01-17 RX ADMIN — SODIUM CHLORIDE SCH MLS: 9 INJECTION, SOLUTION INTRAVENOUS at 03:52

## 2022-01-17 RX ADMIN — SODIUM CHLORIDE SCH MLS: 9 INJECTION, SOLUTION INTRAVENOUS at 20:00

## 2022-01-17 RX ADMIN — SODIUM CHLORIDE SCH MLS: 9 INJECTION, SOLUTION INTRAVENOUS at 13:16

## 2022-01-17 RX ADMIN — SODIUM CHLORIDE SCH MLS: 0.9 INJECTION, SOLUTION INTRAVENOUS at 10:10

## 2022-01-17 RX ADMIN — SODIUM CHLORIDE SCH MLS: 0.9 INJECTION, SOLUTION INTRAVENOUS at 00:23

## 2022-01-17 NOTE — PN
Date of Progress Note:  01/17/2022



Diagnosis:  Bowel obstruction, incarcerated ventral hernia.



Subjective:  The patient is doing better.  Today, we started to give him some fluid.  We have to jorge
mber that his bowel was incarcerated.  At one point, we have to determine the viability of it, so carolina
n though I did not have to remove the bowel, we have to consider this bowel was trapped for some time
, may have some swelling present.  Today, we started clear liquid diet and started with.



Objective:  Abdomen:  Soft and depressible.  Intact surgical site.  Bowel sounds positive. 

Extremities:  Good capillary refill.



Plan:  We are going to advance diet slowly and then by tomorrow morning, if he is tolerating diet, we
 should be able to send him home.  We encouraged ambulation, incentive spirometry.





HM/MODL

DD:  01/17/2022 12:22:57Voice ID:  [id number]

DT:  01/17/2022 12:39:08Report ID:  [id number]

## 2022-01-17 NOTE — P.PN
Subjective


Date of Service: 01/17/22


Chief Complaint: Abdominal pain


Patient denies any pain.


He states he has been passing gas.








Physical Examination





- Vital Signs


Temperature: 97.3 F


Blood Pressure: 136/71


Pulse: 55


Respirations: 16


Pulse Ox (%): 97





- Physical Exam


General: Alert, In no apparent distress, Oriented x3


HEENT: Mucous membr. moist/pink


Neck: JVD not distended


Respiratory: Clear to auscultation bilaterally, Normal air movement


Cardiovascular: No edema, Regular rate/rhythm, Normal S1 S2


Gastrointestinal: Hypoactive, Soft and benign, Non-distended


Musculoskeletal: No swelling


Integumentary: No rashes


Neurological: Normal strength at 5/5 x4 extr





Assessment And Plan





- Current Problems (Diagnosis)


(1) Drug-induced encephalopathy


Current Visit: Yes   Status: Acute   





(2) Small bowel obstruction


Current Visit: Yes   Status: Acute   





(3) Incarcerated hernia


Current Visit: Yes   Status: Acute   





- Plan


Status post hernia reduction and repair.


Patient clinically improved, denies any symptoms.  States he is hungry.


Patient started on clear liquid diet.


Dr. Mcnamara is following


Pain medication as needed.


Empiric IV Zosyn.  Discontinue antibiotics tomorrow


Discontinue IV fluid


CPAP during sleep.

## 2022-01-18 VITALS — SYSTOLIC BLOOD PRESSURE: 119 MMHG | DIASTOLIC BLOOD PRESSURE: 75 MMHG | TEMPERATURE: 96.9 F

## 2022-01-18 VITALS — OXYGEN SATURATION: 99 %

## 2022-01-18 LAB
BUN BLD-MCNC: 13 MG/DL (ref 7–18)
GLUCOSE SERPLBLD-MCNC: 84 MG/DL (ref 74–106)
HCT VFR BLD CALC: 37.4 % (ref 39.6–49)
LYMPHOCYTES # SPEC AUTO: 2 K/UL (ref 0.7–4.9)
PMV BLD: 8.9 FL (ref 7.6–11.3)
POTASSIUM SERPL-SCNC: 3.8 MMOL/L (ref 3.5–5.1)
RBC # BLD: 4.27 M/UL (ref 4.33–5.43)

## 2022-01-18 RX ADMIN — Medication SCH ML: at 09:00

## 2022-01-18 RX ADMIN — HUMAN INSULIN SCH: 100 INJECTION, SOLUTION SUBCUTANEOUS at 00:00

## 2022-01-18 RX ADMIN — SODIUM CHLORIDE SCH MLS: 9 INJECTION, SOLUTION INTRAVENOUS at 05:01

## 2022-01-18 RX ADMIN — ENOXAPARIN SODIUM SCH MG: 40 INJECTION SUBCUTANEOUS at 09:00

## 2022-01-18 RX ADMIN — HUMAN INSULIN SCH: 100 INJECTION, SOLUTION SUBCUTANEOUS at 05:05

## 2022-01-18 RX ADMIN — SODIUM CHLORIDE SCH MLS: 9 INJECTION, SOLUTION INTRAVENOUS at 12:04

## 2022-01-18 NOTE — PN
Date of Progress Note:  01/18/2022



Reason For Service:  Small bowel obstruction, incarcerated ventral hernia.



Subjective:  The patient is doing well.  No complaint.  No nausea.  No vomiting.  Ambulating.  Passin
g flatus.



Review of Systems:

Ten points otherwise unremarkable.



Objective:  Chest:  Clear. 

Abdomen:  Soft and depressible.  Intact surgical site.  Bowel sounds positive. 

Extremities:  Good capillary refill.



Plan:  The patient is tolerating diet, so he will be able to go home.  Follow up in my office in 1 we
ek.  Call for appointment at [phone].  May take showers with dressings off.  The patient advised the
 importance of no heavy lifting.





REBECCA/SUNIL

DD:  01/18/2022 13:14:12Voice ID:  [id number]

DT:  01/18/2022 13:22:17Report ID:  [id number]

## 2022-01-18 NOTE — P.DS
Admission Date: 01/16/22


Discharge Date: 01/18/22


Disposition: ROUTINE DISCHARGE


Discharge Condition: GOOD


Reason for Admission: Abdominal pain, SBO


Consultations: 





General Surgery - Dr. Mcnamara





Procedures: 


CT Abd/Pelvis (1/16):


FINDINGS:  Multiple small pulmonary nodules are seen in the lung bases.


 Mild diffuse fatty liver is present. Significant distention of the stomach with

fluid. The spleen, pancreas, adrenal glands and kidneys within normal limits.


 Umbilical hernia is present containing single small bowel loop. There is fluid 

in the hernia sac likely indicating hernia incarceration. Moderately distended 

small bowel loops are present throughout the abdomen compatible with moderate 

mechanical bowel obstruction. Mild sigmoid diverticulosis. The appendix is 

normal. No free air, significant free fluid or abscess. No evidence of 

significant lymphadenopathy.


 Moderate lumbar and lumbosacral degenerative changes.


 


IMPRESSION:  Moderate mechanical bowel obstruction is present caused by 

moderate-sized incarcerated umbilical hernia containing small bowel loop.


 


Exploratory laparotomy, lysis of adhesions, repair of incarcerated umbilical 

hernia (1/16) by Dr. Mcnamara








Problem List


Small bowel obstruction secondary to incarcerated hernia





Brief History of Present Illness: 


41-year-old gentleman with a history of obesity, obstructive sleep apnea, 

substance abuse, umbilical hernia presented to the ED with a complaint of 

abdominal pain of sudden onset.  Patient was somnolent after receiving IV 

morphine for pain and could not provide any history.  CT abdomen and pelvis done

in the ED demonstrated incarcerated umbilical hernia with small bowel 

obstruction.  General surgery Dr. Mcnamara contacted who is planning on emergent

surgery.





Hospital Course: 


Patient taken to OR and underwent repair as noted above. Postoperative course 

was uncomplicated and his diet was slowly advanced to soft diet.


He was deemed stable for discharge home, to follow up with Dr. Mcnamara in ~2 

weeks.





Vital Signs/Physical Exam: 














Temp Pulse Resp BP Pulse Ox


 


 96.9 F   78   16   119/75   97 


 


 01/18/22 12:00  01/18/22 12:00  01/18/22 12:00  01/18/22 12:00  01/18/22 12:00








General: Alert, In no apparent distress


Respiratory: Clear to auscultation bilaterally, Normal air movement


Cardiovascular: Regular rate/rhythm, Normal S1 S2


Gastrointestinal: Normal bowel sounds, Soft and benign


Musculoskeletal: No tenderness


Integumentary: No significant lesion


Neurological: Normal speech, Normal affect


Laboratory Data at Discharge: 














WBC  8.30 K/uL (4.3-10.9)  D 01/18/22  05:09    


 


Hgb  12.1 g/dL (13.6-17.9)  L  01/18/22  05:09    


 


Hct  37.4 % (39.6-49.0)  L  01/18/22  05:09    


 


Plt Count  266 K/uL (152-406)   01/18/22  05:09    


 


Sodium  140 mmol/L (136-145)   01/18/22  05:09    


 


Potassium  3.8 mmol/L (3.5-5.1)   01/18/22  05:09    


 


BUN  13 mg/dL (7-18)   01/18/22  05:09    


 


Creatinine  0.60 mg/dL (0.55-1.3)   01/18/22  05:09    


 


Glucose  84 mg/dL ()   01/18/22  05:09    


 


Phosphorus  3.4 mg/dL (2.5-4.9)   01/17/22  05:32    


 


Magnesium  2.7 mg/dL (1.8-2.4)  H  01/17/22  05:32    


 


Total Bilirubin  1.1 mg/dL (0.2-1.0)  H  01/17/22  05:32    


 


AST  16 U/L (15-37)   01/17/22  05:32    


 


ALT  24 U/L (12-78)   01/17/22  05:32    


 


Alkaline Phosphatase  48 U/L ()   01/17/22  05:32    


 


Lipase  64 U/L ()  L  01/16/22  07:58    








Home Medications: 








Pantoprazole [Protonix Tab*] 40 mg PO DAILYAC #30 tab 06/10/19 


Codeine/APAP [Tylenol W/Codeine #3 tab] 1 tab PO Q8HP PRN #10 tab 01/18/22 





New Medications: 


Codeine/APAP [Tylenol W/Codeine #3 tab] 1 tab PO Q8HP PRN #10 tab


 PRN Reason: Pain


Physician Discharge Instructions: 


You had an incarcerated umbilical hernia that was causing blockage of your 

intestines. You underwent surgical correction by Dr. Mcnamara.


Discharged home. Follow up with Dr. Mcnamara in 1 week.


Continue GI soft / low fiber diet.


No antibiotics required.





Activity: Ad madelyn


Followup: 


Jerrell Mcnamara MD [ACTIVE - CAN ADMIT] -  (Call to schedule appointment in one 

week.)


Time spent managing pt's care (in minutes): 45

## 2022-01-19 NOTE — EKG
Test Date:    2022-01-16               Test Time:    09:58:38

Technician:   MIKE                                    

                                                     

MEASUREMENT RESULTS:                                       

Intervals:                                           

Rate:         79                                     

HI:           154                                    

QRSD:         92                                     

QT:           396                                    

QTc:          454                                    

Axis:                                                

P:            71                                     

HI:           154                                    

QRS:          159                                    

T:            67                                     

                                                     

INTERPRETIVE STATEMENTS:                                       

                                                     

Sinus rhythm with fusion complexes

Right axis deviation

Abnormal ECG

Compared to ECG 01/21/2020 02:18:44

Fusion complex(es) now present

Right-axis deviation now present

Sinus arrhythmia no longer present

Left anterior fascicular block no longer present



Electronically Signed On 01-19-22 07:27:03 CST by Pascual Estes

## 2023-09-06 NOTE — EDPHYS
Physician Documentation                                                                           

 [facility]                                                                 

Name: Edgar Patel                                                                         

Age: 41 yrs                                                                                       

Sex: Male                                                                                         

: 1980                                                                                   

MRN: [id number]                                                                                   

Arrival Date: 2022                                                                          

Time: 18:19                                                                                       

Account#: [id number]                                                                            

Bed DIS1                                                                                          

Private MD:                                                                                       

ED Physician Luis Angel Stallworth                                                                      

HPI:                                                                                              

                                                                                             

23:25 This 41 yrs old Male presents to ER via Ambulatory with complaints of Cough, Fever,     jr8 

      headache.                                                                                   

23:25 Onset: The symptoms/episode began/occurred acutely, 4 day(s) ago. Severity of symptoms: jr8 

      At their worst the symptoms were moderate, in the emergency department the symptoms are     

      unchanged. Modifying factors: The symptoms are alleviated by nothing, the symptoms are      

      aggravated by nothing. Associated signs and symptoms: The patient has no apparent           

      associated signs or symptoms. The patient has not experienced similar symptoms in the       

      past. The patient has not recently seen a physician.                                        

                                                                                                  

Historical:                                                                                       

- Allergies:                                                                                      

20:22 No Known Allergies;                                                                     bb  

- Home Meds:                                                                                      

20:22 None [Active];                                                                          bb  

- PMHx:                                                                                           

20:22 Asthma; Bipolar disorder; Diabetes; Hypertension; Sleep Apnea;                          bb  

                                                                                                  

- Immunization history:: Client reports receiving the 2nd dose of the Covid vaccine,              

  does not know which one.                                                                        

- Social history:: Smoking status: Patient reports the use of cigarette tobacco                   

  products, smokes one-half pack cigarettes per day.                                              

                                                                                                  

                                                                                                  

ROS:                                                                                              

23:25 Constitutional: Positive for body aches, chills, fever.                                 jr8 

23:25 Respiratory: Positive for cough, Negative for shortness of breath, sputum production,       

      wheezing.                                                                                   

23:25 Neuro: Positive for headache.                                                               

23:25 All other systems are negative.                                                             

                                                                                                  

Exam:                                                                                             

23:25 Constitutional:  This is a well developed, well nourished patient who is awake, alert,  jr8 

      and in no acute distress. ENT:  Nares patent. No nasal discharge, no septal                 

      abnormalities noted.  Tympanic membranes are normal and external auditory canals are        

      clear.  Oropharynx with no redness, swelling, or masses, exudates, or evidence of           

      obstruction, uvula midline.  Mucous membranes moist. Neck:  Trachea midline, no             

      thyromegaly or masses palpated, and no cervical lymphadenopathy.  Supple, full range of     

      motion without nuchal rigidity, or vertebral point tenderness.  No Meningismus.             

      Respiratory:  Lungs have equal breath sounds bilaterally, clear to auscultation and         

      percussion.  No rales, rhonchi or wheezes noted.  No increased work of breathing, no        

      retractions or nasal flaring. Abdomen/GI:  Soft, non-tender, with normal bowel sounds.      

      No distension or tympany.  No guarding or rebound.  No evidence of tenderness               

      throughout. Back:  No spinal tenderness.  No costovertebral tenderness.  Full range of      

      motion. Skin:  Warm, dry with normal turgor.  Normal color with no rashes, no lesions,      

      and no evidence of cellulitis. MS/ Extremity:  Pulses equal, no cyanosis.                   

      Neurovascular intact.  Full, normal range of motion. Neuro:  Awake and alert, GCS 15,       

      oriented to person, place, time, and situation.  Cranial nerves II-XII grossly intact.      

      Motor strength 5/5 in all extremities.  Sensory grossly intact.                             

23:25 Cardiovascular: Rate: tachycardic, Pulses: Pulses are 2+ in right radial artery and         

      left radial artery. Heart sounds: normal, normal S1and S2, no S3 or S4, no murmur, no       

      rub, no gallop, Edema: is not appreciated, JVD: is not appreciated.                         

                                                                                                  

Vital Signs:                                                                                      

20:20  / 96; Pulse 110; Resp 26 S; Temp 98.6(O); Pulse Ox 98% on R/A; Weight 129.27 kg  bb  

      (R); Height 5 ft. 6 in. (167.64 cm) (R); Pain 10/10;                                        

                                                                                             

00:25  / 71; Pulse 112; Resp 16; Temp 98.7(O); Pulse Ox 94% ;                           lt3 

                                                                                             

20:20 Body Mass Index 46.00 (129.27 kg, 167.64 cm)                                            bb  

                                                                                                  

MDM:                                                                                              

                                                                                             

23:08 Patient medically screened.                                                             jr8 

                                                                                             

00:00 Data reviewed: vital signs, nurses notes, radiologic studies, plain films. Data         jr8 

      interpreted: Pulse oximetry: on room air is 98 %. Interpretation: normal. Counseling: I     

      had a detailed discussion with the patient and/or guardian regarding: the historical        

      points, exam findings, and any diagnostic results supporting the discharge/admit            

      diagnosis, radiology results, the need for outpatient follow up, a family practitioner,     

      to return to the emergency department if symptoms worsen or persist or if there are any     

      questions or concerns that arise at home.                                                   

00:23 ED course: Patient at rest has been between 95 to 98% oxygen saturation room air. With  jr8 

      ambulation will go to 94% oxygen saturation again room air. No significant increase in      

      work of breathing. Close return precautions given to patient as patient does have a         

      retrocardiac pneumonia. I have put patient on antibiotics along with cough medicine.        

      Needs to follow-up with his primary care in the next 1 to 2 days..                          

                                                                                                  

                                                                                             

20:25 Order name: COVID-19/FLU A+B (Document "Date of Onset" if Symptomatic); Complete Time:  bb  

      22:58                                                                                       

                                                                                             

23:18 Order name: XRAY Chest Pa And Lat (2 Views)                                             jr8 

                                                                                                  

Administered Medications:                                                                         

                                                                                             

23:27 Drug: Tussionex Pennkinetic ER (chlorpheniramine-hydrocodone) Suspension 5 ml Route: PO;bb  

                                                                                             

00:28 Follow up: Response: No adverse reaction                                                  

00:16 Drug: LevaQUIN (levofloxacin) 750 mg Route: PO;                                           

00:28 Follow up: Response: Medication administered at discharge.                                

                                                                                                  

                                                                                                  

Disposition:                                                                                      

06:19 Co-signature as Attending Physician, Luis Angel Stallworth MD.                                 [facility] 

                                                                                                  

Disposition Summary:                                                                              

22 00:01                                                                                    

Discharge Ordered                                                                                 

      Location: Home                                                                          jr8 

      Problem: new                                                                            jr8 

      Symptoms: have improved                                                                 jr8 

      Condition: Stable                                                                       jr8 

      Diagnosis                                                                                   

        - Unspecified bacterial pneumonia                                                     jr8 

      Followup:                                                                               jr8 

        - With: Private Physician                                                                  

        - When: 5 - 6 days                                                                         

        - Reason: Recheck today's complaints, Continuance of care, Re-evaluation by your           

      physician                                                                                   

      Discharge Instructions:                                                                     

        - Discharge Summary Sheet                                                             jr8 

        - Community-Acquired Pneumonia, Adult                                                 jr8 

      Forms:                                                                                      

        - Medication Reconciliation Form                                                      jr8 

        - Thank You Letter                                                                    jr8 

        - Antibiotic Education                                                                jr8 

        - Prescription Opioid Use                                                             jr8 

      Prescriptions:                                                                              

        - promethazine-DM 6.25-15 mg/5 mL Oral syrup                                               

            - take 5 milliliter by ORAL route every 4-6 hours as needed, not to exceed 30 mL  jr8 

      in 24 hours; 120 milliliter; Refills: 0, Product Selection Permitted                        

        - levofloxacin 750 mg Oral Tablet                                                          

            - take 1 tablet by ORAL route once daily for 7 days; 7 tablet; Refills: 0,        jr8 

      Product Selection Permitted                                                                 

Signatures:                                                                                       

[facility]                           EDMS                                                 

Joanne Capps RN                     RN   Brian Arndt PA PA   [facility]                                                  

Luis Angel Stallworth MD MD   7                                                  

                                                                                                  

************************************************************************************************** Home Suture Removal Text: Patient was provided instructions on removing sutures and will remove their sutures at home.  If they have any questions or difficulties they will call the office.